# Patient Record
Sex: FEMALE | Race: BLACK OR AFRICAN AMERICAN | URBAN - METROPOLITAN AREA
[De-identification: names, ages, dates, MRNs, and addresses within clinical notes are randomized per-mention and may not be internally consistent; named-entity substitution may affect disease eponyms.]

---
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
## 2023-03-31 NOTE — ED PROVIDER NOTE - CONDITION AT DISCHARGE:
Refill request received for   mometasone (NASONEX) 50 MCG/ACT nasal spray    Last office visit: 2/9/2023  Next office visit: Visit date not found  Last refill: Never filled before  Last labs: 2/9/23     Satisfactory

## 2024-09-18 ENCOUNTER — APPOINTMENT (OUTPATIENT)
Dept: ANTEPARTUM | Facility: CLINIC | Age: 24
End: 2024-09-18

## 2024-09-18 ENCOUNTER — INPATIENT (INPATIENT)
Facility: HOSPITAL | Age: 24
LOS: 4 days | Discharge: HOME | End: 2024-09-23
Attending: SPECIALIST | Admitting: SPECIALIST
Payer: MEDICAID

## 2024-09-18 VITALS
DIASTOLIC BLOOD PRESSURE: 94 MMHG | SYSTOLIC BLOOD PRESSURE: 153 MMHG | HEART RATE: 78 BPM | TEMPERATURE: 99 F | RESPIRATION RATE: 16 BRPM

## 2024-09-18 DIAGNOSIS — O26.899 OTHER SPECIFIED PREGNANCY RELATED CONDITIONS, UNSPECIFIED TRIMESTER: ICD-10-CM

## 2024-09-18 DIAGNOSIS — Z98.890 OTHER SPECIFIED POSTPROCEDURAL STATES: Chronic | ICD-10-CM

## 2024-09-18 LAB
ALBUMIN SERPL ELPH-MCNC: 3.7 G/DL — SIGNIFICANT CHANGE UP (ref 3.3–5)
ALP SERPL-CCNC: 225 U/L — HIGH (ref 40–120)
ALT FLD-CCNC: 14 U/L — SIGNIFICANT CHANGE UP (ref 4–33)
ANION GAP SERPL CALC-SCNC: 13 MMOL/L — SIGNIFICANT CHANGE UP (ref 7–14)
APPEARANCE UR: CLEAR — SIGNIFICANT CHANGE UP
AST SERPL-CCNC: 18 U/L — SIGNIFICANT CHANGE UP (ref 4–32)
BACTERIA # UR AUTO: ABNORMAL /HPF
BASOPHILS # BLD AUTO: 0 K/UL — SIGNIFICANT CHANGE UP (ref 0–0.2)
BASOPHILS NFR BLD AUTO: 0 % — SIGNIFICANT CHANGE UP (ref 0–2)
BILIRUB SERPL-MCNC: 0.2 MG/DL — SIGNIFICANT CHANGE UP (ref 0.2–1.2)
BILIRUB UR-MCNC: NEGATIVE — SIGNIFICANT CHANGE UP
BLD GP AB SCN SERPL QL: NEGATIVE — SIGNIFICANT CHANGE UP
BUN SERPL-MCNC: 4 MG/DL — LOW (ref 7–23)
CALCIUM SERPL-MCNC: 9.6 MG/DL — SIGNIFICANT CHANGE UP (ref 8.4–10.5)
CAST: 4 /LPF — SIGNIFICANT CHANGE UP (ref 0–4)
CHLORIDE SERPL-SCNC: 105 MMOL/L — SIGNIFICANT CHANGE UP (ref 98–107)
CO2 SERPL-SCNC: 19 MMOL/L — LOW (ref 22–31)
COLOR SPEC: YELLOW — SIGNIFICANT CHANGE UP
CREAT ?TM UR-MCNC: 154 MG/DL — SIGNIFICANT CHANGE UP
CREAT SERPL-MCNC: 0.55 MG/DL — SIGNIFICANT CHANGE UP (ref 0.5–1.3)
DIFF PNL FLD: NEGATIVE — SIGNIFICANT CHANGE UP
EGFR: 130 ML/MIN/1.73M2 — SIGNIFICANT CHANGE UP
EOSINOPHIL # BLD AUTO: 0.01 K/UL — SIGNIFICANT CHANGE UP (ref 0–0.5)
EOSINOPHIL NFR BLD AUTO: 0.1 % — SIGNIFICANT CHANGE UP (ref 0–6)
GLUCOSE SERPL-MCNC: 97 MG/DL — SIGNIFICANT CHANGE UP (ref 70–99)
GLUCOSE UR QL: NEGATIVE MG/DL — SIGNIFICANT CHANGE UP
HCT VFR BLD CALC: 35.4 % — SIGNIFICANT CHANGE UP (ref 34.5–45)
HGB BLD-MCNC: 11.6 G/DL — SIGNIFICANT CHANGE UP (ref 11.5–15.5)
IANC: 4.87 K/UL — SIGNIFICANT CHANGE UP (ref 1.8–7.4)
IMM GRANULOCYTES NFR BLD AUTO: 0.6 % — SIGNIFICANT CHANGE UP (ref 0–0.9)
KETONES UR-MCNC: NEGATIVE MG/DL — SIGNIFICANT CHANGE UP
LDH SERPL L TO P-CCNC: 194 U/L — SIGNIFICANT CHANGE UP (ref 135–225)
LEUKOCYTE ESTERASE UR-ACNC: NEGATIVE — SIGNIFICANT CHANGE UP
LYMPHOCYTES # BLD AUTO: 2.45 K/UL — SIGNIFICANT CHANGE UP (ref 1–3.3)
LYMPHOCYTES # BLD AUTO: 30.8 % — SIGNIFICANT CHANGE UP (ref 13–44)
MCHC RBC-ENTMCNC: 27.9 PG — SIGNIFICANT CHANGE UP (ref 27–34)
MCHC RBC-ENTMCNC: 32.8 GM/DL — SIGNIFICANT CHANGE UP (ref 32–36)
MCV RBC AUTO: 85.1 FL — SIGNIFICANT CHANGE UP (ref 80–100)
MONOCYTES # BLD AUTO: 0.57 K/UL — SIGNIFICANT CHANGE UP (ref 0–0.9)
MONOCYTES NFR BLD AUTO: 7.2 % — SIGNIFICANT CHANGE UP (ref 2–14)
NEUTROPHILS # BLD AUTO: 4.87 K/UL — SIGNIFICANT CHANGE UP (ref 1.8–7.4)
NEUTROPHILS NFR BLD AUTO: 61.3 % — SIGNIFICANT CHANGE UP (ref 43–77)
NITRITE UR-MCNC: NEGATIVE — SIGNIFICANT CHANGE UP
NRBC # BLD: 0 /100 WBCS — SIGNIFICANT CHANGE UP (ref 0–0)
NRBC # FLD: 0 K/UL — SIGNIFICANT CHANGE UP (ref 0–0)
PH UR: 6.5 — SIGNIFICANT CHANGE UP (ref 5–8)
PLATELET # BLD AUTO: 213 K/UL — SIGNIFICANT CHANGE UP (ref 150–400)
POTASSIUM SERPL-MCNC: 4 MMOL/L — SIGNIFICANT CHANGE UP (ref 3.5–5.3)
POTASSIUM SERPL-SCNC: 4 MMOL/L — SIGNIFICANT CHANGE UP (ref 3.5–5.3)
PROT ?TM UR-MCNC: 220 MG/DL — SIGNIFICANT CHANGE UP
PROT SERPL-MCNC: 6.6 G/DL — SIGNIFICANT CHANGE UP (ref 6–8.3)
PROT UR-MCNC: 300 MG/DL
PROT/CREAT UR-RTO: 1.4 RATIO — HIGH (ref 0–0.2)
RBC # BLD: 4.16 M/UL — SIGNIFICANT CHANGE UP (ref 3.8–5.2)
RBC # FLD: 14.4 % — SIGNIFICANT CHANGE UP (ref 10.3–14.5)
RBC CASTS # UR COMP ASSIST: 1 /HPF — SIGNIFICANT CHANGE UP (ref 0–4)
REVIEW: SIGNIFICANT CHANGE UP
RH IG SCN BLD-IMP: POSITIVE — SIGNIFICANT CHANGE UP
RH IG SCN BLD-IMP: POSITIVE — SIGNIFICANT CHANGE UP
SODIUM SERPL-SCNC: 137 MMOL/L — SIGNIFICANT CHANGE UP (ref 135–145)
SP GR SPEC: 1.02 — SIGNIFICANT CHANGE UP (ref 1–1.03)
SQUAMOUS # UR AUTO: 4 /HPF — SIGNIFICANT CHANGE UP (ref 0–5)
URATE SERPL-MCNC: 5.4 MG/DL — SIGNIFICANT CHANGE UP (ref 2.5–7)
UROBILINOGEN FLD QL: 0.2 MG/DL — SIGNIFICANT CHANGE UP (ref 0.2–1)
WBC # BLD: 7.95 K/UL — SIGNIFICANT CHANGE UP (ref 3.8–10.5)
WBC # FLD AUTO: 7.95 K/UL — SIGNIFICANT CHANGE UP (ref 3.8–10.5)
WBC UR QL: 3 /HPF — SIGNIFICANT CHANGE UP (ref 0–5)

## 2024-09-18 RX ORDER — OXYTOCIN/RINGER'S LACTATE 20/500ML
167 PLASTIC BAG, INJECTION (ML) INTRAVENOUS
Qty: 30 | Refills: 0 | Status: DISCONTINUED | OUTPATIENT
Start: 2024-09-18 | End: 2024-09-18

## 2024-09-18 RX ORDER — SODIUM CHLORIDE IRRIG SOLUTION 0.9 %
1000 SOLUTION, IRRIGATION IRRIGATION ONCE
Refills: 0 | Status: DISCONTINUED | OUTPATIENT
Start: 2024-09-18 | End: 2024-09-19

## 2024-09-18 RX ORDER — SODIUM CHLORIDE IRRIG SOLUTION 0.9 %
1000 SOLUTION, IRRIGATION IRRIGATION
Refills: 0 | Status: DISCONTINUED | OUTPATIENT
Start: 2024-09-18 | End: 2024-09-23

## 2024-09-18 RX ORDER — SODIUM CHLORIDE IRRIG SOLUTION 0.9 %
1000 SOLUTION, IRRIGATION IRRIGATION ONCE
Refills: 0 | Status: DISCONTINUED | OUTPATIENT
Start: 2024-09-18 | End: 2024-09-18

## 2024-09-18 RX ORDER — CHLORHEXIDINE GLUCONATE ORAL RINSE 1.2 MG/ML
1 SOLUTION DENTAL DAILY
Refills: 0 | Status: DISCONTINUED | OUTPATIENT
Start: 2024-09-18 | End: 2024-09-19

## 2024-09-18 RX ORDER — MAGNESIUM SULFATE 500 MG/ML
2 VIAL (ML) INJECTION
Qty: 40 | Refills: 0 | Status: DISCONTINUED | OUTPATIENT
Start: 2024-09-18 | End: 2024-09-19

## 2024-09-18 RX ORDER — CHLORHEXIDINE GLUCONATE ORAL RINSE 1.2 MG/ML
1 SOLUTION DENTAL DAILY
Refills: 0 | Status: DISCONTINUED | OUTPATIENT
Start: 2024-09-18 | End: 2024-09-18

## 2024-09-18 RX ORDER — SODIUM CHLORIDE IRRIG SOLUTION 0.9 %
1000 SOLUTION, IRRIGATION IRRIGATION
Refills: 0 | Status: DISCONTINUED | OUTPATIENT
Start: 2024-09-18 | End: 2024-09-18

## 2024-09-18 RX ORDER — MORPHINE SULFATE 30 MG/1
4 TABLET, FILM COATED, EXTENDED RELEASE ORAL ONCE
Refills: 0 | Status: DISCONTINUED | OUTPATIENT
Start: 2024-09-18 | End: 2024-09-18

## 2024-09-18 RX ORDER — OXYTOCIN/RINGER'S LACTATE 20/500ML
167 PLASTIC BAG, INJECTION (ML) INTRAVENOUS
Qty: 30 | Refills: 0 | Status: DISCONTINUED | OUTPATIENT
Start: 2024-09-18 | End: 2024-09-20

## 2024-09-18 RX ORDER — SODIUM CHLORIDE IRRIG SOLUTION 0.9 %
1000 SOLUTION, IRRIGATION IRRIGATION
Refills: 0 | Status: DISCONTINUED | OUTPATIENT
Start: 2024-09-18 | End: 2024-09-19

## 2024-09-18 RX ORDER — MAGNESIUM SULFATE 500 MG/ML
4 VIAL (ML) INJECTION ONCE
Refills: 0 | Status: COMPLETED | OUTPATIENT
Start: 2024-09-18 | End: 2024-09-18

## 2024-09-18 RX ADMIN — CHLORHEXIDINE GLUCONATE ORAL RINSE 1 APPLICATION(S): 1.2 SOLUTION DENTAL at 21:50

## 2024-09-18 RX ADMIN — Medication 10 MILLIGRAM(S): at 23:25

## 2024-09-18 RX ADMIN — Medication 300 GRAM(S): at 23:33

## 2024-09-18 RX ADMIN — Medication 50 GM/HR: at 23:56

## 2024-09-18 RX ADMIN — Medication 125 MILLILITER(S): at 21:45

## 2024-09-18 RX ADMIN — MORPHINE SULFATE 4 MILLIGRAM(S): 30 TABLET, FILM COATED, EXTENDED RELEASE ORAL at 23:46

## 2024-09-18 NOTE — OB PROVIDER H&P - NS_OBGYNHISTORY_OBGYN_ALL_OB_FT
OB History: Primigravida    GYN History: Denies history of endometriosis/uterine fibroids/cysts/abnormal pap smears/STIs

## 2024-09-18 NOTE — OB PROVIDER H&P - ATTENDING COMMENTS
Agree with above  Admit for IOL for FGR AC 4%  Pt received prenatal care from [facility]  Elevated BPs noted in triage, continue to monitor, HELLP labs  For BC/CB  Epidural ELEONORA pires MD

## 2024-09-18 NOTE — OB PROVIDER H&P - BLOOD TRANSFUSION, PREVIOUS, PROFILE

## 2024-09-18 NOTE — OB PROVIDER H&P - NSHPPHYSICALEXAM_GEN_ALL_CORE
ICU Vital Signs Last 24 Hrs  T(C): 37.3 (18 Sep 2024 19:29), Max: 37.3 (18 Sep 2024 19:29)  T(F): 99.1 (18 Sep 2024 19:29), Max: 99.1 (18 Sep 2024 19:29)  HR: 81 (18 Sep 2024 20:20) (75 - 85)  BP: 154/82 (18 Sep 2024 20:20) (139/100 - 164/110)  RR: 16 (18 Sep 2024 19:29) (16 - 16)    General: Patient sitting comfortably in bed, A&Ox3  Abd: soft, non-tender, gravid, TOCO in place  Bedside Transabdominal Sonogram: Vertex presentation, posterior placenta, +FH noted, M mode 145 BPM, SUNDAY 10.23cm, BPP 8/8, images saved in ASOB  EFM: 140 BPM baseline, moderate variability, +accels, - decels, category I tracing, reactive  [place]: Irregular contractions  SVE: 0.5/0/-3  Ext:  FROM bilateral no edema  Neuro: grossly intact

## 2024-09-18 NOTE — OB PROVIDER H&P - PROBLEM SELECTOR PLAN 1
P: Admit to L&D for IOL      Induction/Augmentation agent: Vaginal Cytotec & Cook Balloon      HTN labs pending      BP monitoring      Clear liquid diet      EFM/TOCO      IV access      Admit labs & consents      4 epidural PRN

## 2024-09-18 NOTE — OB PROVIDER H&P - HISTORY OF PRESENT ILLNESS
24 y/o  at 39w4d GA, EMANUEL 24, sent from office for IOL for fetal growth restriction.  Patient was dx'd with FGR 3 weeks ago, was previously refusing induction, now agreeable to IOL.  EFW 11% & AC 7% at 37 weeks --> EFW 11% & AC 4% at 39 weeks.  Endorses good fetal movement. Denies: fever, vaginal bleeding, leakage of fluid, abdominal pain, nausea/vomiting.    Primary OB care with Barbara Ortiz CNM @ [facility]  Pregnancy complicated by fetal growth restiction  GBS: Negative   MFM sono from 24: EFW 2949g (11%), AC 4%  EFW **   24 y/o  at 39w4d GA, EMANUEL 24, sent from office for IOL for fetal growth restriction.  Patient was dx'd with FGR 3 weeks ago, was previously refusing induction, now agreeable to IOL.  EFW 11% & AC 7% at 37 weeks --> EFW 11% & AC 4% at 39 weeks.  Endorses good fetal movement. Denies: fever, vaginal bleeding, leakage of fluid, abdominal pain, nausea/vomiting.    Primary OB care with Barbara Ortiz CNM @ [facility]  Pregnancy complicated by fetal growth restriction  GBS: Negative   MFM sono from 24: EFW 2949g (11%), AC 4%

## 2024-09-18 NOTE — OB PROVIDER H&P - NSLOWPPHRISK_OBGYN_A_OB
No previous uterine incision/Friedman Pregnancy/Less than or equal to 4 previous vaginal births/No known bleeding disorder/No history of postpartum hemorrhage/No other PPH risks indicated

## 2024-09-18 NOTE — OB PROVIDER H&P - ASSESSMENT
A: 22 yo  @ 39w4d with fetal growth restriction (EFW 11%, AC 4%), for IOL. GBS negative. Elevated BPs noted in triage, 1 severe range noted.    P: Admit to L&D for IOL      Induction/Augmentation agent: Vaginal Cytotec & Cook Balloon      HTN labs pending      BP monitoring      Clear liquid diet      EFM/TOCO      IV access      Admit labs & consents      4 epidural PRN    Risks, benefits, alternatives, and possible complications have been discussed in detail by Dr Quigley with the patient in her native language, Pre-admission, admission, post admission procedures and expectations were discussed in detail. All questions answered, all appropriate hospital consents were signed. Anticipate normal vaginal delivery.  Informed consent was obtained. The following was discussed:  - Induction/augmentation of labor: use of medication or cook balloon to begin or enhance labor  - Obstetrical management including maternal-fetal contraction monitoring    Case discussed with Dr Dann Teresa PA-C

## 2024-09-19 LAB
AMPHET UR-MCNC: NEGATIVE — SIGNIFICANT CHANGE UP
BARBITURATES UR SCN-MCNC: NEGATIVE — SIGNIFICANT CHANGE UP
BENZODIAZ UR-MCNC: NEGATIVE — SIGNIFICANT CHANGE UP
COCAINE METAB.OTHER UR-MCNC: NEGATIVE — SIGNIFICANT CHANGE UP
CREATININE URINE RESULT, DAU: 60 MG/DL — SIGNIFICANT CHANGE UP
FENTANYL UR QL SCN: NEGATIVE — SIGNIFICANT CHANGE UP
MAGNESIUM SERPL-MCNC: 4.6 MG/DL — HIGH (ref 1.6–2.6)
MAGNESIUM SERPL-MCNC: 5.6 MG/DL — HIGH (ref 1.6–2.6)
MAGNESIUM SERPL-MCNC: 5.7 MG/DL — HIGH (ref 1.6–2.6)
METHADONE UR-MCNC: NEGATIVE — SIGNIFICANT CHANGE UP
OPIATES UR-MCNC: POSITIVE
OXYCODONE UR-MCNC: NEGATIVE — SIGNIFICANT CHANGE UP
PCP SPEC-MCNC: SIGNIFICANT CHANGE UP
PCP UR-MCNC: NEGATIVE — SIGNIFICANT CHANGE UP
T PALLIDUM AB TITR SER: NEGATIVE — SIGNIFICANT CHANGE UP
THC UR QL: NEGATIVE — SIGNIFICANT CHANGE UP

## 2024-09-19 RX ORDER — OXYTOCIN/RINGER'S LACTATE 20/500ML
PLASTIC BAG, INJECTION (ML) INTRAVENOUS
Qty: 30 | Refills: 0 | Status: DISCONTINUED | OUTPATIENT
Start: 2024-09-19 | End: 2024-09-19

## 2024-09-19 RX ORDER — FAMOTIDINE 40 MG
20 TABLET ORAL ONCE
Refills: 0 | Status: COMPLETED | OUTPATIENT
Start: 2024-09-19 | End: 2024-09-19

## 2024-09-19 RX ORDER — ONDANSETRON HCL/PF 4 MG/2 ML
4 VIAL (ML) INJECTION ONCE
Refills: 0 | Status: DISCONTINUED | OUTPATIENT
Start: 2024-09-19 | End: 2024-09-23

## 2024-09-19 RX ORDER — ACETAMINOPHEN 325 MG
1000 TABLET ORAL ONCE
Refills: 0 | Status: COMPLETED | OUTPATIENT
Start: 2024-09-19 | End: 2024-09-19

## 2024-09-19 RX ORDER — CHLORHEXIDINE GLUCONATE ORAL RINSE 1.2 MG/ML
1 SOLUTION DENTAL DAILY
Refills: 0 | Status: DISCONTINUED | OUTPATIENT
Start: 2024-09-19 | End: 2024-09-20

## 2024-09-19 RX ORDER — OXYTOCIN/RINGER'S LACTATE 20/500ML
PLASTIC BAG, INJECTION (ML) INTRAVENOUS
Qty: 30 | Refills: 0 | Status: DISCONTINUED | OUTPATIENT
Start: 2024-09-19 | End: 2024-09-20

## 2024-09-19 RX ADMIN — Medication 20 MILLIGRAM(S): at 12:16

## 2024-09-19 RX ADMIN — Medication 50 GM/HR: at 18:42

## 2024-09-19 RX ADMIN — MORPHINE SULFATE 4 MILLIGRAM(S): 30 TABLET, FILM COATED, EXTENDED RELEASE ORAL at 00:05

## 2024-09-19 RX ADMIN — Medication 400 MILLIGRAM(S): at 12:15

## 2024-09-19 RX ADMIN — MORPHINE SULFATE 4 MILLIGRAM(S): 30 TABLET, FILM COATED, EXTENDED RELEASE ORAL at 00:15

## 2024-09-19 RX ADMIN — Medication 2 MILLIUNIT(S)/MIN: at 15:06

## 2024-09-19 RX ADMIN — Medication 50 GM/HR: at 07:21

## 2024-09-19 RX ADMIN — Medication 30 MILLIGRAM(S): at 02:23

## 2024-09-19 RX ADMIN — Medication 1000 MILLIGRAM(S): at 12:45

## 2024-09-19 NOTE — OB PROVIDER IHI INDUCTION/AUGMENTATION NOTE - NS_OBIHITYPE_OBGYN_ALL_OB
Induction
Nj Abarca  Nephrology  [address], [address]  [place] [address]  Phone: [phone]  Fax: [phone]  Follow Up Time: 1 week    Kevin Zavala  Cardiovascular Disease  [address], [address]  [place] [address]  Phone: [phone]  Fax: [phone]  Follow Up Time: Routine

## 2024-09-20 ENCOUNTER — TRANSCRIPTION ENCOUNTER (OUTPATIENT)
Age: 24
End: 2024-09-20

## 2024-09-20 LAB
MAGNESIUM SERPL-MCNC: 5.2 MG/DL — HIGH (ref 1.6–2.6)
MAGNESIUM SERPL-MCNC: 5.3 MG/DL — HIGH (ref 1.6–2.6)
MAGNESIUM SERPL-MCNC: 5.3 MG/DL — HIGH (ref 1.6–2.6)
MAGNESIUM SERPL-MCNC: 5.8 MG/DL — HIGH (ref 1.6–2.6)
RUBV IGG SER-ACNC: 14.9 INDEX — SIGNIFICANT CHANGE UP
RUBV IGG SER-IMP: POSITIVE — SIGNIFICANT CHANGE UP

## 2024-09-20 PROCEDURE — 59409 OBSTETRICAL CARE: CPT | Mod: U9

## 2024-09-20 RX ORDER — KETOROLAC TROMETHAMINE 10 MG/1
30 TABLET, FILM COATED ORAL ONCE
Refills: 0 | Status: DISCONTINUED | OUTPATIENT
Start: 2024-09-20 | End: 2024-09-20

## 2024-09-20 RX ORDER — MAGNESIUM SULFATE 500 MG/ML
2 VIAL (ML) INJECTION
Qty: 40 | Refills: 0 | Status: DISCONTINUED | OUTPATIENT
Start: 2024-09-20 | End: 2024-09-20

## 2024-09-20 RX ORDER — ANTI-ITCH CREAM 1 G/100G
1 OINTMENT TOPICAL EVERY 6 HOURS
Refills: 0 | Status: DISCONTINUED | OUTPATIENT
Start: 2024-09-20 | End: 2024-09-23

## 2024-09-20 RX ORDER — OXYCODONE HYDROCHLORIDE 30 MG/1
5 TABLET, FILM COATED, EXTENDED RELEASE ORAL
Refills: 0 | Status: DISCONTINUED | OUTPATIENT
Start: 2024-09-20 | End: 2024-09-23

## 2024-09-20 RX ORDER — ACETAMINOPHEN 325 MG
3 TABLET ORAL
Qty: 0 | Refills: 0 | DISCHARGE
Start: 2024-09-20

## 2024-09-20 RX ORDER — PRAMOXINE HYDROCHLORIDE 10 MG/ML
1 LOTION TOPICAL EVERY 4 HOURS
Refills: 0 | Status: DISCONTINUED | OUTPATIENT
Start: 2024-09-20 | End: 2024-09-23

## 2024-09-20 RX ORDER — ACETAMINOPHEN 325 MG
975 TABLET ORAL
Refills: 0 | Status: DISCONTINUED | OUTPATIENT
Start: 2024-09-20 | End: 2024-09-23

## 2024-09-20 RX ORDER — PRENATAL VIT,CAL 76/IRON/FOLIC 29 MG-1 MG
1 TABLET ORAL DAILY
Refills: 0 | Status: DISCONTINUED | OUTPATIENT
Start: 2024-09-20 | End: 2024-09-23

## 2024-09-20 RX ORDER — DIPHENHYDRAMINE HCL 12.5MG/5ML
25 LIQUID (ML) ORAL EVERY 6 HOURS
Refills: 0 | Status: DISCONTINUED | OUTPATIENT
Start: 2024-09-20 | End: 2024-09-23

## 2024-09-20 RX ORDER — ACETAMINOPHEN/DIPHENHYDRAMINE 500MG-25MG
1 TABLET ORAL
Refills: 0 | DISCHARGE

## 2024-09-20 RX ORDER — PYRIDOXINE HCL (VITAMIN B6) 50 MG
0 TABLET ORAL
Refills: 0 | DISCHARGE

## 2024-09-20 RX ORDER — MAGNESIUM SULFATE 500 MG/ML
2 VIAL (ML) INJECTION
Qty: 40 | Refills: 0 | Status: DISCONTINUED | OUTPATIENT
Start: 2024-09-20 | End: 2024-09-21

## 2024-09-20 RX ORDER — OXYCODONE HYDROCHLORIDE 30 MG/1
5 TABLET, FILM COATED, EXTENDED RELEASE ORAL ONCE
Refills: 0 | Status: DISCONTINUED | OUTPATIENT
Start: 2024-09-20 | End: 2024-09-23

## 2024-09-20 RX ORDER — SODIUM CHLORIDE 0.9 % (FLUSH) 0.9 %
3 SYRINGE (ML) INJECTION EVERY 8 HOURS
Refills: 0 | Status: DISCONTINUED | OUTPATIENT
Start: 2024-09-20 | End: 2024-09-23

## 2024-09-20 RX ORDER — SOAP/LANOLIN
1 BAR TOPICAL
Qty: 0 | Refills: 0 | DISCHARGE
Start: 2024-09-20

## 2024-09-20 RX ORDER — DIBUCAINE 1 %
1 OINTMENT (GRAM) TOPICAL EVERY 6 HOURS
Refills: 0 | Status: DISCONTINUED | OUTPATIENT
Start: 2024-09-20 | End: 2024-09-23

## 2024-09-20 RX ORDER — MAGNESIUM HYDROXIDE 400 MG/5ML
30 SUSPENSION, ORAL (FINAL DOSE FORM) ORAL
Refills: 0 | Status: DISCONTINUED | OUTPATIENT
Start: 2024-09-20 | End: 2024-09-23

## 2024-09-20 RX ORDER — PRENATAL VIT,CAL 76/IRON/FOLIC 29 MG-1 MG
1 TABLET ORAL
Refills: 0 | DISCHARGE

## 2024-09-20 RX ORDER — SOAP/LANOLIN
1 BAR TOPICAL EVERY 4 HOURS
Refills: 0 | Status: DISCONTINUED | OUTPATIENT
Start: 2024-09-20 | End: 2024-09-23

## 2024-09-20 RX ORDER — OXYTOCIN/RINGER'S LACTATE 20/500ML
666 PLASTIC BAG, INJECTION (ML) INTRAVENOUS
Qty: 30 | Refills: 0 | Status: DISCONTINUED | OUTPATIENT
Start: 2024-09-20 | End: 2024-09-20

## 2024-09-20 RX ORDER — PANTOPRAZOLE SODIUM 40 MG/1
40 TABLET, DELAYED RELEASE ORAL
Refills: 0 | Status: DISCONTINUED | OUTPATIENT
Start: 2024-09-20 | End: 2024-09-20

## 2024-09-20 RX ORDER — PANTOPRAZOLE SODIUM 40 MG/1
40 TABLET, DELAYED RELEASE ORAL DAILY
Refills: 0 | Status: DISCONTINUED | OUTPATIENT
Start: 2024-09-20 | End: 2024-09-23

## 2024-09-20 RX ORDER — TETANUS TOXOID, REDUCED DIPHTHERIA TOXOID AND ACELLULAR PERTUSSIS VACCINE, ADSORBED 5; 2.5; 8; 8; 2.5 [IU]/.5ML; [IU]/.5ML; UG/.5ML; UG/.5ML; UG/.5ML
0.5 SUSPENSION INTRAMUSCULAR ONCE
Refills: 0 | Status: DISCONTINUED | OUTPATIENT
Start: 2024-09-20 | End: 2024-09-23

## 2024-09-20 RX ADMIN — Medication 50 GM/HR: at 08:01

## 2024-09-20 RX ADMIN — KETOROLAC TROMETHAMINE 30 MILLIGRAM(S): 10 TABLET, FILM COATED ORAL at 11:50

## 2024-09-20 RX ADMIN — Medication 975 MILLIGRAM(S): at 20:45

## 2024-09-20 RX ADMIN — Medication 30 MILLIGRAM(S): at 02:35

## 2024-09-20 RX ADMIN — Medication 50 GM/HR: at 19:06

## 2024-09-20 RX ADMIN — Medication 975 MILLIGRAM(S): at 20:13

## 2024-09-20 RX ADMIN — PANTOPRAZOLE SODIUM 40 MILLIGRAM(S): 40 TABLET, DELAYED RELEASE ORAL at 22:27

## 2024-09-20 RX ADMIN — Medication 50 GM/HR: at 13:27

## 2024-09-20 NOTE — DISCHARGE NOTE OB - CARE PLAN
1 Principal Discharge DX:	 (normal spontaneous vaginal delivery)  Assessment and plan of treatment:	After discharge, please stay on pelvic rest for 6 weeks, meaning no sexual intercourse, no tampons and no douching.  No driving for 2 weeks as women can loose a lot of blood during delivery and there is a possibility of being lightheaded/fainting.  No lifting objects heavier than baby for two weeks.  Expect to have vaginal bleeding/spotting for up to six weeks.  The bleeding should get lighter and more white/light brown with time.  For bleeding soaking more than a pad an hour or passing clots greater than the size of your fist, come in to the emergency department.    Follow up in clinic in 6 weeks.  Secondary Diagnosis:	Severe preeclampsia  Assessment and plan of treatment:	Due to diagnosis of preeclampsia with severe features, please return for OBGYN visit for blood pressure check within 48-72 hours of discharge. At home, please measure blood pressures three times a day. Call OBGYN if pressures are above 140/90 and/or when exhibiting signs or symptoms of pre-eclampsia such as headache, vision changes, difficulty breathing, right quadrant abdominal pain or any concerns.

## 2024-09-20 NOTE — OB PROVIDER DELIVERY SUMMARY - NSSELHIDDEN_OBGYN_ALL_OB_FT
[NS_DeliveryAttending1_OBGYN_ALL_OB_FT:[email or web address]],[NS_DeliveryAssist1_OBGYN_ALL_OB_FT:[id number]],[NS_DeliveryRN_OBGYN_ALL_OB_FT:[email or web address]],[NS_CirculateRN2_OBGYN_ALL_OB_FT:[id number]]

## 2024-09-20 NOTE — DISCHARGE NOTE OB - CARE PROVIDER_API CALL
Elana Ortiz  [address] [place] [address]  Phone: [phone]  Fax: (   )    -  Established Patient  Follow Up Time:    [facility], Oncology Building, Basement  [address], [place] [address]  Phone: [phone]  Fax: (   )    -  Follow Up Time:

## 2024-09-20 NOTE — OB PROVIDER LABOR PROGRESS NOTE - NS_OBIHICONTRACTIONPATTERNDETAILS_OBGYN_ALL_OB_FT
q2 then periods of no contractions.
uterine irritability
irregular
q 2-3 min
IUPC in place inadequate now as pitocin discontinued
q 1-4 min
q2 min
q4m

## 2024-09-20 NOTE — DISCHARGE NOTE OB - PROVIDER TOKENS
FREE:[LAST:[Angel],FIRST:[Elana],PHONE:[phone]],FAX:[(   )    -],ADDRESS:[[address] [place] [address]],ESTABLISHEDPATIENT:[T]] FREE:[LAST:[[facility]],FIRST:[Oncology Building, Basement],PHONE:[phone]],FAX:[(   )    -],ADDRESS:[[address] [place] [address]]]

## 2024-09-20 NOTE — DISCHARGE NOTE OB - PATIENT PORTAL LINK FT
You can access the FollowMyHealth Patient Portal offered by [facility] by registering at the following website: [email or web address]. By joining [facility]’s FollowMyHealth portal, you will also be able to view your health information using other applications (apps) compatible with our system.

## 2024-09-20 NOTE — DISCHARGE NOTE OB - PLAN OF CARE
After discharge, please stay on pelvic rest for 6 weeks, meaning no sexual intercourse, no tampons and no douching.  No driving for 2 weeks as women can loose a lot of blood during delivery and there is a possibility of being lightheaded/fainting.  No lifting objects heavier than baby for two weeks.  Expect to have vaginal bleeding/spotting for up to six weeks.  The bleeding should get lighter and more white/light brown with time.  For bleeding soaking more than a pad an hour or passing clots greater than the size of your fist, come in to the emergency department.    Follow up in clinic in 6 weeks. Due to diagnosis of preeclampsia with severe features, please return for OBGYN visit for blood pressure check within 48-72 hours of discharge. At home, please measure blood pressures three times a day. Call OBGYN if pressures are above 140/90 and/or when exhibiting signs or symptoms of pre-eclampsia such as headache, vision changes, difficulty breathing, right quadrant abdominal pain or any concerns.

## 2024-09-20 NOTE — OB PROVIDER LABOR PROGRESS NOTE - ASSESSMENT
VC placed. CB still in place.     Missy Canals PGY1
IOL for sPEC/Mg  FHT reassuring  pt making cervical change  c/w pitocin     D/w Dr Roshan Das-Pierre PGY4 
-IUPC placed for pitocin titration  -ptiocin currently at 8mu, will uptitrate to adequacy  - FHT Cat 1    D/w Dr. Franklin Das-Pierre PGY4
22 yo  @ 39.6wga IOL FGR preeclampsia with severe features.  GBS neg    1.  FHT category II improved now that pitocin is discontinued and pt repositioned to right lateral position.  Will continue to monitor closely.     2.  Preeclampsia with severe features- BP's within goal range on current regimen of Procarida 30mg XL.  Pt on magnesium sulfate for seizure ppx.  No si/sx of mag toxicity at this time.  U/o adequate.      3.  IOL- Pt now 9.5cm will restart pitocin when FHT allows.  Will start pushing once fully dilated.       Mary Perez MD
3cm around the cervical balloon.   Patient receiving VC, contraction pattern irregular. Concern for hyperstimulation of the uterus with repeat dose.   Will transition to PO until CB is expelled.     DW: Dr. Chris Cloud, PGY4 
A/P: 24y/o P0 @39w5d IOL for FGR with sPEC on Mg  - Labor: spCB/VC/PO, Pit@3p, AROM@430p  - Fetus: cat 1  - GBS: neg  - Pain: sp morphine x1, declining epidural at this time.     Continue to titrate Pitocin as able.     Shanique Mccullough, PGY2  d/w Dr. Cloud and Dr. Perez   
A/P 23y  @39w4d IOL for PEC, now meeting criteria for sPEC  -IOL: CB in place, c/w VC  -Cat 1 tracing  -GBS neg  -EFW 2960  -sPEC - c/w Mg, s/p Pro 10 IR with resolution of SRBP, c/w [id number] qd, HELLP labs wnl, P/C 1.4, continue to monitor  -Analgesia - will order morphine 4x4  -Anticipate     d/w Dr. Nancy Wong, PGY-3
Plan   cont IOL, FHR tracing improving following scalp stim and improvement of blood pressures   will cont pitocin once sustained cat 1 tracing     Ofelia HERCULES 
 @39.5wks FGR, sPEC/Mg   VE unchanged  ISE placed secondary to inability to obtain FHR via external monitoring  Pitocin discontinued  Ephedrine given by CRNA  Patient repositioned   Cont fetal/maternal monitoring  Cont magnesium/BP monitoring  Will restart pitocin when FHR tracing allows    jh Lopez NP
CB and VC placed  Cont EFM and toco    Missy Canals PGY1
Pt requesting epidural for pain.   - for epidural PRN   - Cont EFM and toco  - cont Pit for IOL    D/w Dr. Niraj Angeles PGY1
 @39.6wks FGR  sp cervical balloon, cytotec   sp AROM  ISE remains   Fetal decent of head noted on exam  Cont pitocin  Cont fetal/maternal monitoring    dw MD Niraj Lopez NP

## 2024-09-20 NOTE — OB PROVIDER DELIVERY SUMMARY - NSVAGINALEXAMCERT_OBGYN_ALL_OB
The Delivery OB Provider certifies that vaginal examination and/or abdominal examination after the delivery was done and no foreign body was found.

## 2024-09-20 NOTE — OB PROVIDER DELIVERY SUMMARY - NSPROVIDERDELIVERYNOTE_OBGYN_ALL_OB_FT
Spontaneous vaginal delivery of liveborn infant from OA position. Head, shoulders, and body delivered easily. Infant passed to mother. Cord was clamped and cut. Placenta delivered spontaneously, noted to be intact. Fundal massage was given. Bimanual massage demonstrated a clear uterine sweep. Clot reformed in the DIANA, removed and 1000mcg of cytotec was given. Vaginal exam revealed intact cervix, sulci, vaginal walls. Perineum with second degree laceration, repaired in standard fashion with chromic suture. Excellent hemostasis was noted. Patient stable. Count correct x 2. Magnesium x24 hours post partum

## 2024-09-20 NOTE — OB PROVIDER LABOR PROGRESS NOTE - NSVAGINALEXAM_OBGYN_ALL_OB_DT
18-Sep-2024 22:48
19-Sep-2024 19:11
19-Sep-2024 20:00
20-Sep-2024 05:33
18-Sep-2024 23:46
20-Sep-2024 02:05
19-Sep-2024 16:25
20-Sep-2024 07:38
20-Sep-2024 00:25

## 2024-09-20 NOTE — DISCHARGE NOTE OB - ADDITIONAL INSTRUCTIONS
Follow up with your outpatient provider 1-3 days after discharge for a blood pressure check.  Follow up with your outpatient provider in 4-6 weeks for routine postpartum care. Follow up with your outpatient provider 1-3 days after discharge for a blood pressure check.  Follow up with your outpatient provider in 4-6 weeks for routine postpartum care.    Home with home care.

## 2024-09-20 NOTE — OB PROVIDER LABOR PROGRESS NOTE - NS_SUBJECTIVE/OBJECTIVE_OBGYN_ALL_OB_FT
Attending Labor Note    Patient examined for Cat 2 FHR. s/p Prolonged decel in setting of low BP after epidural. After return to baseline, period of minimal variability. Cervical exam unchanged.
Labor & Delivery Progress Note     Pt seen & examined at bedside for IUPC placement.     T(C): 36.9 (09-20-24 @ 00:26), Max: 37.3 (09-19-24 @ 13:01)  HR: 96 (09-20-24 @ 02:03) (67 - 118)  BP: 128/74 (09-20-24 @ 01:41) (85/49 - 159/97)  RR: 16 (09-19-24 @ 17:30) (15 - 18)  SpO2: 93% (09-20-24 @ 02:03) (90% - 100%)
Patient seen for eval of the CB.
Pt seen and examined at bedside for increased pain. VE unchanged.
patient seen and examined at bedside secondary to FHR decelerations. S/p epidural placement. BPs noted to be 80s/50s. Patient symptomatic. Anesthesia called to bedside for ephedrine.   VS  T(C): 37.1 (09-19-24 @ 17:30)  HR: 108 (09-19-24 @ 20:17)  BP: 131/- (09-19-24 @ 20:18)  RR: 16 (09-19-24 @ 17:30)  SpO2: 98% (09-19-24 @ 20:14)
Patient c/o rectal pressure. Seen and examined at bedside.  VS  T(C): 36.9 (09-20-24 @ 00:26)  HR: 88 (09-20-24 @ 00:40)  BP: 136/85 (09-20-24 @ 00:26)  RR: 16 (09-19-24 @ 17:30)  SpO2: 92% (09-20-24 @ 00:37)
Pt seen and examined at bedside for CB placement.
Pt seen and examined at bedside for VC placement.
Went to examine pt due to prolonged decel.  Pt comfortable sp recent epidural top off.
Labor & Delivery Progress Note     Pt seen & examined at bedside for cervical change.     T(C): 37.0 (09-20-24 @ 05:26), Max: 37.3 (09-19-24 @ 13:01)  HR: 78 (09-20-24 @ 05:30) (67 - 118)  BP: 140/85 (09-20-24 @ 05:27) (85/49 - 159/97)  RR: 16 (09-20-24 @ 05:26) (16 - 18)  SpO2: 94% (09-20-24 @ 05:30) (90% - 100%)
OB Labor Note    S: Patient seen and examined at bedside.     T(C): 37.2 (09-19-24 @ 15:30), Max: 37.3 (09-18-24 @ 19:29)  HR: 97 (09-19-24 @ 16:19) (65 - 118)  BP: 139/90 (09-19-24 @ 16:14) (112/83 - 171/109)  BP(mean): --  ABP: --  ABP(mean): --  RR: 18 (09-19-24 @ 15:30) (15 - 18)  SpO2: 100% (09-19-24 @ 16:19) (92% - 100%)  Wt(kg): --  CVP(mm Hg): --  CI: --  CAPILLARY BLOOD GLUCOSE       N/A      09-18 @ 07:01  -  09-19 @ 07:00  --------------------------------------------------------  IN:    IV PiggyBack: 100 mL    Lactated Ringers: 125 mL    Lactated Ringers: 250 mL    Lactated Ringers: 400 mL    Magnesium Sulfate: 400 mL  Total IN: 1275 mL    OUT:    Voided (mL): 800 mL  Total OUT: 800 mL    Total NET: 475 mL      09-19 @ 07:01  -  09-19 @ 16:26  --------------------------------------------------------  IN:    Lactated Ringers: 350 mL    Magnesium Sulfate: 350 mL  Total IN: 700 mL    OUT:    Voided (mL): 1050 mL  Total OUT: 1050 mL    Total NET: -350 mL
R3 Labor Note    S: Patient evaluated at bedside for cervical change. Pt reports more pain and requesting IV pain medication. CB noted to be firmly in place. Pt with 2 sustained SRBPs, meeting criteria for sPEC. Patient denies headaches, fevers, chills, changes in vision, nausea, vomiting, or RUQ pain. Discussed diagnosis with patient and plan for Pro 10IR and [id number] qd in addition to Magnesium for seizure ppx.    O:  T(C): 36.9 (09-18-24 @ 22:15), Max: 37.3 (09-18-24 @ 19:29)  HR: 83 (09-18-24 @ 23:44) (65 - 85)  BP: 149/92 (09-18-24 @ 23:44) (126/90 - 171/109)  RR: 15 (09-18-24 @ 22:15) (15 - 16)  SpO2: 100% (09-18-24 @ 23:39) (100% - 100%)

## 2024-09-20 NOTE — OB PROVIDER LABOR PROGRESS NOTE - NS_OBIHIFHRDETAILS_OBGYN_ALL_OB_FT
EFM: 125/mod. variability/+accels/+intermittent variable decels
145 minimal variability, improved variability with scalp sti
130 mod deloris/+accels/-decels
130/mod/-accels/-decels
EFM:130 /mod. variability/+accels/-decels  [place]: irregular
130 moderate variability, +15x15 accels, prolonged decel x 6 mins
140/mod/+accels/-decels
125 min-mod deloris/-accels/+late/prolonged decelerations

## 2024-09-20 NOTE — OB RN DELIVERY SUMMARY - NSSELHIDDEN_OBGYN_ALL_OB_FT
[NS_DeliveryAttending1_OBGYN_ALL_OB_FT:[email or web address]],[NS_DeliveryAssist1_OBGYN_ALL_OB_FT:[id number]],[NS_DeliveryRN_OBGYN_ALL_OB_FT:[email or web address]],[NS_CirculateRN2_OBGYN_ALL_OB_FT:[id number]]

## 2024-09-20 NOTE — OB RN DELIVERY SUMMARY - NS_SKININTERRUPTA_OBGYN_ALL_OB
[place]'s clinical indication Maternal request, with counseling and support/[place]'s clinical indication

## 2024-09-20 NOTE — OB RN DELIVERY SUMMARY - NS_LABORCHARACTER_OBGYN_ALL_OB
Induction of labor-AROM/Induction of labor-Medicinal/Augmentation of labor/Internal electronic FM Induction of labor-AROM/Induction of labor-Medicinal/Augmentation of labor/Internal electronic FM/External electronic FM

## 2024-09-20 NOTE — DISCHARGE NOTE OB - NS MD DC FALL RISK RISK
For information on Fall & Injury Prevention, visit: [email or web address] OR  [email or web address] OR  https://www.cdc.gov/steadi/patient.html

## 2024-09-20 NOTE — DISCHARGE NOTE OB - BREAST MILK IS MORE DIGESTIBLE, MAKING VOMITING, DIARRHEA, GAS AND CONSTIPATION LESS COMMON
-- DO NOT REPLY / DO NOT REPLY ALL --  -- Message is from the [facility]--    COVID-19 Universal Screening: Negative    General Patient Message      Reason for Call: PATIENT CALLED IN AND STATED THAT HE NEVER RECEIVED HIS 2ND FOLLOW UP CALL, HE IS UPSET HE NEVER GOT A CALL BACK IN REGARDS TO HIS FINGER PAIN.     Caller Information       Type Contact Phone    06/26/2020 04:09 PM Phone (Incoming) Isidoro Francis (Self) [phone] (M)    07/06/2020 09:01 AM Phone (Incoming) Isidoro Francis (Self) [phone] (M)          Alternative phone number: NONE     Turnaround time given to caller:   \"This message will be sent to [state Provider's name]. The clinical team will fulfill your request as soon as they review your message.\"     Statement Selected

## 2024-09-20 NOTE — OB RN DELIVERY SUMMARY - NS_SEPSISRSKCALC_OBGYN_ALL_OB_FT
EOS calculated successfully. EOS Risk Factor: 0.5/1000 live births ([facility] national incidence); GA=39w6d; Temp=99.14; ROM=16.633; GBS='Negative'; Antibiotics='No antibiotics or any antibiotics < 2 hrs prior to birth'

## 2024-09-20 NOTE — DISCHARGE NOTE OB - HOSPITAL COURSE
Patient had an uncomplicated  followed by a postpartum course complicated by preeclampsia with severe features. Patient required a push of Procardia 10mg IR on  and was started on Procardia 30XL qd on . Patient also completed 24 hr of Magnesium sulfate for seizure prophylaxis on -. , Hct 35.4->***. On postpartum day ***, patient was discharged home in stable condition, voiding spontaneously and with normal vital signs.   Patient had an uncomplicated  followed by a postpartum course complicated by preeclampsia with severe features. Patient required a push of Procardia 10mg IR on  and was started on Procardia 30XL qd on . Patient also completed 24 hr of Magnesium sulfate for seizure prophylaxis on -. , Hct 35.4->22.6->28.9. On postpartum day 3, patient was discharged home in stable condition, voiding spontaneously and with normal vital signs.   Patient had an uncomplicated  followed by a postpartum course complicated by preeclampsia with severe features. Patient required an oral dose of Procardia 10mg IR on  and was started on Procardia 30XL qd on . Patient also completed 24 hr of Magnesium sulfate for seizure prophylaxis on -. , Hct 35.4->22.6->28.9. On postpartum day 3, patient was discharged home in stable condition, voiding spontaneously and with normal vital signs.

## 2024-09-20 NOTE — OB NEONATOLOGY/PEDIATRICIAN DELIVERY SUMMARY - NSPEDSNEONOTESA_OBGYN_ALL_OB_FT
Baby is a 39.6 wk SGA female born to a 24 y/o  mother via . Peds called to delivery for Cat II and Mg. Maternal history uncomplicated. Prenatal history complicated by IUGR and sPEC. Maternal blood type O+. PNL: HIV neg/RPR NR/HBsAg neg,, rubella pending. GBS negative on . AROM at 1621 on , clear fluids. ROM duration 16h38m. Highest maternal temp 37.2. EOS: 0.23. Baby born vigorous and crying spontaneously. Warmed, dried, suctioned stimulated. Baby given CPAP5/21% from 3MOL intul 8MOL for grunting. Apgars 8/9. Mom plans to breastfeed/bottlefeed and consents hepB.   BW: 2370g  :   TOB: 0859    Physical Exam:  Gen: NAD, +grimace  HEENT: anterior fontanel open soft and flat, no cleft lip/palate, ears normal set, no ear pits or tags. no lesions in mouth/throat, nares clinically patent  Resp: no increased work of breathing, good air entry b/l, clear to auscultation bilaterally  Cardio: Normal S1/S2, regular rate and rhythm, no murmurs, rubs or gallops  Abd: soft, non tender, non distended, + bowel sounds, umbilical cord with 3 vessels  Neuro: +grasp/suck/juan, normal tone  Extremities: negative sidhu and ortolani, moving all extremities, full range of motion x 4, no crepitus  Skin: pink, warm  Genitals: normal female anatomy, Crow 1, anus patent

## 2024-09-21 LAB
ALBUMIN SERPL ELPH-MCNC: 3 G/DL — LOW (ref 3.3–5)
ALP SERPL-CCNC: 162 U/L — HIGH (ref 40–120)
ALT FLD-CCNC: 12 U/L — SIGNIFICANT CHANGE UP (ref 4–33)
ANION GAP SERPL CALC-SCNC: 13 MMOL/L — SIGNIFICANT CHANGE UP (ref 7–14)
AST SERPL-CCNC: 25 U/L — SIGNIFICANT CHANGE UP (ref 4–32)
BASOPHILS # BLD AUTO: 0.01 K/UL — SIGNIFICANT CHANGE UP (ref 0–0.2)
BASOPHILS NFR BLD AUTO: 0.1 % — SIGNIFICANT CHANGE UP (ref 0–2)
BILIRUB SERPL-MCNC: <0.2 MG/DL — SIGNIFICANT CHANGE UP (ref 0.2–1.2)
BUN SERPL-MCNC: 6 MG/DL — LOW (ref 7–23)
CALCIUM SERPL-MCNC: 7.5 MG/DL — LOW (ref 8.4–10.5)
CHLORIDE SERPL-SCNC: 104 MMOL/L — SIGNIFICANT CHANGE UP (ref 98–107)
CO2 SERPL-SCNC: 21 MMOL/L — LOW (ref 22–31)
CREAT SERPL-MCNC: 0.6 MG/DL — SIGNIFICANT CHANGE UP (ref 0.5–1.3)
EGFR: 128 ML/MIN/1.73M2 — SIGNIFICANT CHANGE UP
EOSINOPHIL # BLD AUTO: 0.01 K/UL — SIGNIFICANT CHANGE UP (ref 0–0.5)
EOSINOPHIL NFR BLD AUTO: 0.1 % — SIGNIFICANT CHANGE UP (ref 0–6)
GLUCOSE SERPL-MCNC: 89 MG/DL — SIGNIFICANT CHANGE UP (ref 70–99)
HCT VFR BLD CALC: 22.6 % — LOW (ref 34.5–45)
HCT VFR BLD CALC: 28.9 % — LOW (ref 34.5–45)
HGB BLD-MCNC: 7.7 G/DL — LOW (ref 11.5–15.5)
HGB BLD-MCNC: 9.6 G/DL — LOW (ref 11.5–15.5)
IANC: 7.78 K/UL — HIGH (ref 1.8–7.4)
IMM GRANULOCYTES NFR BLD AUTO: 0.4 % — SIGNIFICANT CHANGE UP (ref 0–0.9)
LDH SERPL L TO P-CCNC: 240 U/L — HIGH (ref 135–225)
LYMPHOCYTES # BLD AUTO: 26.9 % — SIGNIFICANT CHANGE UP (ref 13–44)
LYMPHOCYTES # BLD AUTO: 3.24 K/UL — SIGNIFICANT CHANGE UP (ref 1–3.3)
MAGNESIUM SERPL-MCNC: 5.4 MG/DL — HIGH (ref 1.6–2.6)
MAGNESIUM SERPL-MCNC: 5.5 MG/DL — HIGH (ref 1.6–2.6)
MCHC RBC-ENTMCNC: 28.4 PG — SIGNIFICANT CHANGE UP (ref 27–34)
MCHC RBC-ENTMCNC: 28.6 PG — SIGNIFICANT CHANGE UP (ref 27–34)
MCHC RBC-ENTMCNC: 33.2 GM/DL — SIGNIFICANT CHANGE UP (ref 32–36)
MCHC RBC-ENTMCNC: 34.1 GM/DL — SIGNIFICANT CHANGE UP (ref 32–36)
MCV RBC AUTO: 84 FL — SIGNIFICANT CHANGE UP (ref 80–100)
MCV RBC AUTO: 85.5 FL — SIGNIFICANT CHANGE UP (ref 80–100)
MONOCYTES # BLD AUTO: 0.94 K/UL — HIGH (ref 0–0.9)
MONOCYTES NFR BLD AUTO: 7.8 % — SIGNIFICANT CHANGE UP (ref 2–14)
NEUTROPHILS # BLD AUTO: 7.78 K/UL — HIGH (ref 1.8–7.4)
NEUTROPHILS NFR BLD AUTO: 64.7 % — SIGNIFICANT CHANGE UP (ref 43–77)
NRBC # BLD: 0 /100 WBCS — SIGNIFICANT CHANGE UP (ref 0–0)
NRBC # BLD: 0 /100 WBCS — SIGNIFICANT CHANGE UP (ref 0–0)
NRBC # FLD: 0 K/UL — SIGNIFICANT CHANGE UP (ref 0–0)
NRBC # FLD: 0 K/UL — SIGNIFICANT CHANGE UP (ref 0–0)
PLATELET # BLD AUTO: 185 K/UL — SIGNIFICANT CHANGE UP (ref 150–400)
PLATELET # BLD AUTO: 207 K/UL — SIGNIFICANT CHANGE UP (ref 150–400)
POTASSIUM SERPL-MCNC: 3.6 MMOL/L — SIGNIFICANT CHANGE UP (ref 3.5–5.3)
POTASSIUM SERPL-SCNC: 3.6 MMOL/L — SIGNIFICANT CHANGE UP (ref 3.5–5.3)
PROT SERPL-MCNC: 5.3 G/DL — LOW (ref 6–8.3)
RBC # BLD: 2.69 M/UL — LOW (ref 3.8–5.2)
RBC # BLD: 3.38 M/UL — LOW (ref 3.8–5.2)
RBC # FLD: 14.7 % — HIGH (ref 10.3–14.5)
RBC # FLD: 14.9 % — HIGH (ref 10.3–14.5)
SODIUM SERPL-SCNC: 138 MMOL/L — SIGNIFICANT CHANGE UP (ref 135–145)
URATE SERPL-MCNC: 6.1 MG/DL — SIGNIFICANT CHANGE UP (ref 2.5–7)
WBC # BLD: 12.03 K/UL — HIGH (ref 3.8–10.5)
WBC # BLD: 13.16 K/UL — HIGH (ref 3.8–10.5)
WBC # FLD AUTO: 12.03 K/UL — HIGH (ref 3.8–10.5)
WBC # FLD AUTO: 13.16 K/UL — HIGH (ref 3.8–10.5)

## 2024-09-21 RX ORDER — FERROUS SULFATE 325(65) MG
325 TABLET ORAL DAILY
Refills: 0 | Status: DISCONTINUED | OUTPATIENT
Start: 2024-09-21 | End: 2024-09-23

## 2024-09-21 RX ADMIN — Medication 975 MILLIGRAM(S): at 22:30

## 2024-09-21 RX ADMIN — Medication 600 MILLIGRAM(S): at 11:54

## 2024-09-21 RX ADMIN — Medication 600 MILLIGRAM(S): at 18:16

## 2024-09-21 RX ADMIN — Medication 50 GM/HR: at 07:21

## 2024-09-21 RX ADMIN — Medication 975 MILLIGRAM(S): at 02:06

## 2024-09-21 RX ADMIN — Medication 30 MILLIGRAM(S): at 02:06

## 2024-09-21 RX ADMIN — Medication 975 MILLIGRAM(S): at 15:49

## 2024-09-21 RX ADMIN — Medication 1 TABLET(S): at 11:13

## 2024-09-21 RX ADMIN — Medication 3 MILLILITER(S): at 22:00

## 2024-09-21 RX ADMIN — Medication 600 MILLIGRAM(S): at 11:12

## 2024-09-21 RX ADMIN — Medication 600 MILLIGRAM(S): at 01:40

## 2024-09-21 RX ADMIN — Medication 50 MILLILITER(S): at 07:21

## 2024-09-21 RX ADMIN — Medication 600 MILLIGRAM(S): at 18:46

## 2024-09-21 RX ADMIN — Medication 500 MILLIGRAM(S): at 11:13

## 2024-09-21 RX ADMIN — Medication 325 MILLIGRAM(S): at 11:13

## 2024-09-21 RX ADMIN — Medication 3 MILLILITER(S): at 14:40

## 2024-09-21 RX ADMIN — Medication 975 MILLIGRAM(S): at 21:51

## 2024-09-21 RX ADMIN — Medication 975 MILLIGRAM(S): at 16:27

## 2024-09-21 RX ADMIN — Medication 975 MILLIGRAM(S): at 02:40

## 2024-09-21 RX ADMIN — Medication 600 MILLIGRAM(S): at 00:10

## 2024-09-21 NOTE — PROGRESS NOTE ADULT - ASSESSMENT
Assessment:   22yo now postpartum day 1 from a  c/b sPEC on Magnesium, recovering well.     Plan:   #sPEC  - Continue Procardia 30 mg QD  - Continue magnesium, plan to d/c today at 9 AM   - BPs mostly within range, max 141/84 @18:15 on   - Continue to monitor     #Postpartum  - Continue scheduled Ibuprofen and Acetaminophen for pain, Oxycodone available PRN for breakthrough pain.  - Increase ambulation, SCDs when not ambulating  - Continue regular diet    Patrizia Springer, PGY-1

## 2024-09-21 NOTE — PROVIDER CONTACT NOTE (OTHER) - BACKGROUND
NSD from 9/20 @ 0859. PEC w/ severe features PCR 1.4. Patient on magnesium sulfate 2g/hr. Last mag level at 0006 was 5.40.

## 2024-09-21 NOTE — LACTATION INITIAL EVALUATION - INTERVENTION OUTCOME
Primary nurse made aware of current Status. Mother and Infant roomed-in.   Mother educated on safe skin to skin care, safe sleep practices and environment. Call bell within reach.

## 2024-09-21 NOTE — PROVIDER CONTACT NOTE (OTHER) - ASSESSMENT
Patient asymptomatic, denies SOB, chest pain, dizziness, and headache. Procardia 30mg XL was given at 0206. Mag level and PIH labs sent at 0600.

## 2024-09-21 NOTE — LACTATION INITIAL EVALUATION - LACTATION INTERVENTIONS
Mom educated about babies less than 24 hours of age will be sleepy. Made aware of cluster feeding that occurs after 24 hours of life and to be cautious of sleep deprivation in order to maintain infant and mother safety. Instructed to place infant in bassinet or call for assistance if feeling sleepy or tired.Recognition of feeding cues and to feed the baby on demand based on cues at least 8-12 times in a day. Instructed pt. to wake the baby to feed if no feeding cues are seen within 3h since prior feed. Pt. educated on the nutritional needs of the baby, how many wet and dirty diapers to expect, along with the amount of times the baby needs to be placed on the breast at this time.  use  feeding log to record feedings along with wet and dirty diapers. instructed in hand expression with good return demonstration.  Reviewed safe skin to skin. Verbalized understanding of education. pt is sleepy on mag, Safe Breastfeeding Explained Assisted mom with latch and positioning. Encouraged deeper Latch. Discussed  prevention  and  treatment of  sore nipples using colostrum care and/or lanolin. Reviewed proper positioning no matter what position she chooses to use: belly to belly, alignment, and supporting the head and shoulders. Mother and Infant roomed-in.   Mother educated on safe skin to skin care, safe sleep practices and environment. Call bell within reach. ,/initiate/review safe skin-to-skin/initiate/review hand expression/initiate/review techniques for position and latch/initiate/review supplementation plan due to medical indications/initiate/review nipple shield use/review techniques to increase milk supply/review techniques to manage sore nipples/engorgement/initiate/review finger suck/initiate/review breast massage/compression/initiate/review alternate feeding method/reviewed components of an effective feeding and at least 8 effective feedings per day required/reviewed importance of monitoring infant diapers, the breastfeeding log, and minimum output each day/reviewed risks of unnecessary formula supplementation/reviewed risks of artificial nipples/reviewed strategies to transition to breastfeeding only/reviewed benefits and recommendations for rooming in/reviewed feeding on demand/by cue at least 8 times a day/reviewed indications of inadequate milk transfer that would require supplementation

## 2024-09-21 NOTE — PROGRESS NOTE ADULT - SUBJECTIVE AND OBJECTIVE BOX
OB Postpartum Progress Note: PPD #1     22yo now PPD #1 after  seen and examined at bedside, no acute overnight events. Patient denies current complaints, her pain is well controlled. States she is ambulating, voiding spontaneously, passing gas, and tolerating regular diet. Denies CP, SOB, N/V, HA, blurred vision, epigastric pain.    Vital Signs Last 24 Hours  T(C): 36.5 (24 @ 05:55), Max: 36.8 (24 @ 08:01)  HR: 81 (24 @ 05:55) (75 - 126)  BP: 132/99 (24 @ 05:55) (117/77 - 187/90)  RR: 18 (24 @ 05:55) (17 - 18)  SpO2: 100% (24 @ 05:55) (90% - 100%)    Physical Exam:  General: NAD, sleeping comfortably in bed, arousable   Abdomen: Soft, appropriately-tender, non-distended, fundus firm  Extremities: Full ROM, moving all extremities spontaneously, no edema  Pelvic: Lochia wnl    Labs:    Blood Type: O Positive  Antibody Screen: --  RPR: Negative               7.7    12.03 )-----------( 185      (  @ 05:45 )             22.6                11.6   7.95  )-----------( 213      (  @ 19:45 )             35.4         MEDICATIONS  (STANDING):  acetaminophen     Tablet .. 975 milliGRAM(s) Oral <User Schedule>  diphtheria/tetanus/pertussis (acellular) Vaccine (Adacel) 0.5 milliLiter(s) IntraMuscular once  ibuprofen  Tablet. 600 milliGRAM(s) Oral every 6 hours  lactated ringers. 1000 milliLiter(s) (50 mL/Hr) IV Continuous <Continuous>  magnesium sulfate Infusion 2 Gm/Hr (50 mL/Hr) IV Continuous <Continuous>  NIFEdipine XL 30 milliGRAM(s) Oral daily  ondansetron Injectable 4 milliGRAM(s) IV Push once  pantoprazole    Tablet 40 milliGRAM(s) Oral daily  prenatal multivitamin 1 Tablet(s) Oral daily  sodium chloride 0.9% lock flush 3 milliLiter(s) IV Push every 8 hours    MEDICATIONS  (PRN):  benzocaine 20%/menthol 0.5% Spray 1 Spray(s) Topical every 6 hours PRN for Perineal discomfort  dibucaine 1% Ointment 1 Application(s) Topical every 6 hours PRN Perineal discomfort  diphenhydrAMINE 25 milliGRAM(s) Oral every 6 hours PRN Pruritus  hydrocortisone 1% Cream 1 Application(s) Topical every 6 hours PRN Moderate Pain (4-6)  lanolin Ointment 1 Application(s) Topical every 6 hours PRN nipple soreness  magnesium hydroxide Suspension 30 milliLiter(s) Oral two times a day PRN Constipation  oxyCODONE    IR 5 milliGRAM(s) Oral once PRN Moderate to Severe Pain (4-10)  oxyCODONE    IR 5 milliGRAM(s) Oral every 3 hours PRN Moderate to Severe Pain (4-10)  pramoxine 1%/zinc 5% Cream 1 Application(s) Topical every 4 hours PRN Moderate Pain (4-6)  simethicone 80 milliGRAM(s) Chew every 4 hours PRN Gas  witch hazel Pads 1 Application(s) Topical every 4 hours PRN Perineal discomfort

## 2024-09-21 NOTE — PROGRESS NOTE ADULT - ASSESSMENT
INTERVAL HPI/OVERNIGHT EVENTS:  23y Female s/p labor epidural on     Vital Signs Last 24 Hrs  T(C): 36.6 (21 Sep 2024 14:00), Max: 37.1 (21 Sep 2024 10:11)  T(F): 97.9 (21 Sep 2024 14:00), Max: 98.7 (21 Sep 2024 10:11)  HR: 100 (21 Sep 2024 14:00) (81 - 101)  BP: 125/70 (21 Sep 2024 14:00) (117/77 - 141/84)  BP(mean): --  RR: 18 (21 Sep 2024 14:00) (17 - 18)  SpO2: 100% (21 Sep 2024 14:00) (96% - 100%)    Parameters below as of 21 Sep 2024 14:00  Patient On (Oxygen Delivery Method): room air    Patient seen, doing well, no anesthetic complications or complaints noted or reported.

## 2024-09-22 RX ADMIN — Medication 30 MILLIGRAM(S): at 05:47

## 2024-09-22 RX ADMIN — Medication 600 MILLIGRAM(S): at 05:47

## 2024-09-22 RX ADMIN — Medication 325 MILLIGRAM(S): at 15:17

## 2024-09-22 RX ADMIN — Medication 3 MILLILITER(S): at 14:51

## 2024-09-22 RX ADMIN — Medication 600 MILLIGRAM(S): at 06:41

## 2024-09-22 RX ADMIN — Medication 600 MILLIGRAM(S): at 15:18

## 2024-09-22 RX ADMIN — Medication 975 MILLIGRAM(S): at 03:18

## 2024-09-22 RX ADMIN — Medication 600 MILLIGRAM(S): at 16:18

## 2024-09-22 RX ADMIN — Medication 3 MILLILITER(S): at 22:21

## 2024-09-22 RX ADMIN — Medication 3 MILLILITER(S): at 05:49

## 2024-09-22 RX ADMIN — Medication 600 MILLIGRAM(S): at 23:37

## 2024-09-22 RX ADMIN — Medication 975 MILLIGRAM(S): at 04:00

## 2024-09-22 RX ADMIN — Medication 600 MILLIGRAM(S): at 00:16

## 2024-09-22 RX ADMIN — Medication 600 MILLIGRAM(S): at 01:00

## 2024-09-22 RX ADMIN — Medication 500 MILLIGRAM(S): at 15:17

## 2024-09-22 NOTE — PROGRESS NOTE ADULT - SUBJECTIVE AND OBJECTIVE BOX
OB Progress Note:  PPD #2    S: 24yo now PPD#2 after  seen and examined at bedside, no acute overnight events. Patient denies current complaints, her pain is well controlled. States she is ambulating, voiding spontaneously, passing gas, and tolerating regular diet. Denies CP, SOB, N/V, HA, blurred vision, epigastric pain.    O:  Vitals:  Vital Signs Last 24 Hrs  T(C): 36.8 (22 Sep 2024 05:40), Max: 37.2 (22 Sep 2024 01:57)  T(F): 98.3 (22 Sep 2024 05:40), Max: 98.9 (22 Sep 2024 01:57)  HR: 99 (22 Sep 2024 05:40) (82 - 100)  BP: 122/85 (22 Sep 2024 05:40) (110/58 - 131/85)  BP(mean): 71 (22 Sep 2024 01:57) (71 - 71)  RR: 19 (22 Sep 2024 05:40) (17 - 19)  SpO2: 100% (22 Sep 2024 05:40) (96% - 100%)    Parameters below as of 22 Sep 2024 05:40  Patient On (Oxygen Delivery Method): room air        MEDICATIONS  (STANDING):  acetaminophen     Tablet .. 975 milliGRAM(s) Oral <User Schedule>  ascorbic acid 500 milliGRAM(s) Oral daily  diphtheria/tetanus/pertussis (acellular) Vaccine (Adacel) 0.5 milliLiter(s) IntraMuscular once  ferrous    sulfate 325 milliGRAM(s) Oral daily  ibuprofen  Tablet. 600 milliGRAM(s) Oral every 6 hours  lactated ringers. 1000 milliLiter(s) (50 mL/Hr) IV Continuous <Continuous>  NIFEdipine XL 30 milliGRAM(s) Oral daily  ondansetron Injectable 4 milliGRAM(s) IV Push once  pantoprazole    Tablet 40 milliGRAM(s) Oral daily  prenatal multivitamin 1 Tablet(s) Oral daily  sodium chloride 0.9% lock flush 3 milliLiter(s) IV Push every 8 hours      Labs:  Blood type: O Positive  Rubella IgG: RPR: Negative                          9.6[L]   13.16[H] >-----------< 207    (  @ 11:55 )             28.9[L]                        7.7[L]   12.03[H] >-----------< 185    (  @ 05:45 )             22.6[L]    24 @ 05:45      138  |  104  |  6[L]  ----------------------------<  89  3.6   |  21[L]  |  0.60        Ca    7.5[L]      21 Sep 2024 05:45  Mg     5.50[H]     09-21  Mg     5.40[H]     09-21  Mg     5.30[H]     09-20  Mg     5.20[H]     09-20  Mg     5.30[H]     09-20  Mg     5.80[H]     09-19  Mg     5.70[H]     09-19  Mg     5.60[H]         TPro  5.3[L]  /  Alb  3.0[L]  /  TBili  <0.2  /  DBili  x   /  AST  25  /  ALT  12  /  AlkPhos  162[H]  24 @ 05:45      Physical Exam:  General: NAD, sleeping comfortably in bed, arousable   Abdomen: Soft, appropriately-tender, non-distended, fundus firm  Extremities: Full ROM, moving all extremities spontaneously, no edema  Pelvic: Lochia wnl

## 2024-09-22 NOTE — CHART NOTE - NSCHARTNOTEFT_GEN_A_CORE
Patient seen to discuss discharge planning. The patient recently got a new apartment through a [employer] system. However, she is unable to move furniture into the apartment during the weekend, so there is no furniture or bed in the unit.     Patient will stay overnight and be discharged tomorrow (9/23) due to lack of safe discharge today.     Patient seen with Dr. Perez.  Patrizia Springer, PGY-1
Pt Utox positive for opiates this AM 9/19. Pt received dose of morphine 9/18 @11:45p. Pt denies hx of opiate or substance use.       D/w Dr. Niraj Angeles PGY1
Vaginal cytotec #3 placed  CB still in place  tracing Cat 1    S. Das-Pierre PGY4

## 2024-09-22 NOTE — PROGRESS NOTE ADULT - ASSESSMENT
[id number] now postpartum day 2 from a  c/b sPEC s/p Magnesium, recovering well.     Plan:   #sPEC  - Continue Procardia 30 mg QD  - s/p Mg (-)  - BPs mostly within range  - Continue to monitor     #Postpartum  - Continue scheduled Ibuprofen and Acetaminophen for pain, Oxycodone available PRN for breakthrough pain.  - Increase ambulation, SCDs when not ambulating  - Continue regular diet    Patrizia Springer, PGY-1

## 2024-09-23 VITALS
RESPIRATION RATE: 18 BRPM | OXYGEN SATURATION: 100 % | SYSTOLIC BLOOD PRESSURE: 126 MMHG | HEART RATE: 104 BPM | DIASTOLIC BLOOD PRESSURE: 77 MMHG | TEMPERATURE: 99 F

## 2024-09-23 RX ORDER — INFLUENZA VIRUS VACCINE 15; 15; 15; 15 UG/.5ML; UG/.5ML; UG/.5ML; UG/.5ML
0.5 SUSPENSION INTRAMUSCULAR ONCE
Refills: 0 | Status: DISCONTINUED | OUTPATIENT
Start: 2024-09-23 | End: 2024-09-23

## 2024-09-23 RX ADMIN — Medication 600 MILLIGRAM(S): at 00:10

## 2024-09-23 RX ADMIN — Medication 600 MILLIGRAM(S): at 06:40

## 2024-09-23 RX ADMIN — Medication 600 MILLIGRAM(S): at 06:09

## 2024-09-23 RX ADMIN — Medication 3 MILLILITER(S): at 14:22

## 2024-09-23 RX ADMIN — Medication 30 MILLIGRAM(S): at 06:09

## 2024-09-23 RX ADMIN — Medication 3 MILLILITER(S): at 06:07

## 2024-09-23 RX ADMIN — PANTOPRAZOLE SODIUM 40 MILLIGRAM(S): 40 TABLET, DELAYED RELEASE ORAL at 14:25

## 2024-09-23 NOTE — PROGRESS NOTE ADULT - SUBJECTIVE AND OBJECTIVE BOX
OB Progress Note:  PPD#3    S: 22yo PPD#3 s/p  c/b sPEC s/p Magnesium treatment. Patient feels well. Pain is well controlled. She is tolerating a regular diet and passing flatus. She is voiding spontaneously, and ambulating without difficulty. Denies CP/SOB. Denies lightheadedness/dizziness. Denies N/V. Denies fever/chills.    O:  Vitals:   Vital Signs Last 24 Hrs  T(C): 36.7 (23 Sep 2024 05:30), Max: 37.9 (22 Sep 2024 15:36)  T(F): 98.1 (23 Sep 2024 05:30), Max: 100.2 (22 Sep 2024 15:36)  HR: 95 (23 Sep 2024 05:30) (89 - 112)  BP: 129/78 (23 Sep 2024 05:30) (126/76 - 137/83)  BP(mean): --  RR: 16 (23 Sep 2024 05:30) (16 - 18)  SpO2: 100% (23 Sep 2024 05:30) (100% - 100%)    Parameters below as of 23 Sep 2024 05:30  Patient On (Oxygen Delivery Method): room air        MEDICATIONS  (STANDING):  acetaminophen     Tablet .. 975 milliGRAM(s) Oral <User Schedule>  ascorbic acid 500 milliGRAM(s) Oral daily  diphtheria/tetanus/pertussis (acellular) Vaccine (Adacel) 0.5 milliLiter(s) IntraMuscular once  ferrous    sulfate 325 milliGRAM(s) Oral daily  ibuprofen  Tablet. 600 milliGRAM(s) Oral every 6 hours  lactated ringers. 1000 milliLiter(s) (50 mL/Hr) IV Continuous <Continuous>  NIFEdipine XL 30 milliGRAM(s) Oral daily  ondansetron Injectable 4 milliGRAM(s) IV Push once  pantoprazole    Tablet 40 milliGRAM(s) Oral daily  prenatal multivitamin 1 Tablet(s) Oral daily  sodium chloride 0.9% lock flush 3 milliLiter(s) IV Push every 8 hours    MEDICATIONS  (PRN):  benzocaine 20%/menthol 0.5% Spray 1 Spray(s) Topical every 6 hours PRN for Perineal discomfort  dibucaine 1% Ointment 1 Application(s) Topical every 6 hours PRN Perineal discomfort  diphenhydrAMINE 25 milliGRAM(s) Oral every 6 hours PRN Pruritus  hydrocortisone 1% Cream 1 Application(s) Topical every 6 hours PRN Moderate Pain (4-6)  lanolin Ointment 1 Application(s) Topical every 6 hours PRN nipple soreness  magnesium hydroxide Suspension 30 milliLiter(s) Oral two times a day PRN Constipation  oxyCODONE    IR 5 milliGRAM(s) Oral once PRN Moderate to Severe Pain (4-10)  oxyCODONE    IR 5 milliGRAM(s) Oral every 3 hours PRN Moderate to Severe Pain (4-10)  pramoxine 1%/zinc 5% Cream 1 Application(s) Topical every 4 hours PRN Moderate Pain (4-6)  simethicone 80 milliGRAM(s) Chew every 4 hours PRN Gas  witch hazel Pads 1 Application(s) Topical every 4 hours PRN Perineal discomfort      Labs:  Blood type: O Positive  Rubella IgG: RPR: Negative                          9.6[L]   13.16[H] >-----------< 207    (  @ 11:55 )             28.9[L]                        7.7[L]   12.03[H] >-----------< 185    (  @ 05:45 )             22.6[L]    24 @ 05:45      138  |  104  |  6[L]  ----------------------------<  89  3.6   |  21[L]  |  0.60        Ca    7.5[L]      21 Sep 2024 05:45  Mg     5.50[H]     -  Mg     5.40[H]       Mg     5.30[H]     -  Mg     5.20[H]     -    TPro  5.3[L]  /  Alb  3.0[L]  /  TBili  <0.2  /  DBili  x   /  AST  25  /  ALT  12  /  AlkPhos  162[H]  24 @ 05:45          Physical Exam:  General: NAD  Abdomen: soft, non-tender, non-distended, fundus firm  Vaginal: Lochia wnl  Extremities: No erythema/edema     OB Progress Note:  PPD#3    S: 24yo PPD#3 s/p  c/b sPEC s/p Magnesium treatment. Patient feels well. Pain is well controlled. She is tolerating a regular diet and passing flatus. She is voiding spontaneously, and ambulating without difficulty. Patient reports passing a fist-sized dark red clot this morning at 6AM but flushed it down the toilet before telling RN. Pt denies lightheadedness/dizziness, continuous gushing of blood or further clots. Denies CP/SOB. Denies N/V. Denies fever/chills.    O:  Vitals:   Vital Signs Last 24 Hrs  T(C): 36.7 (23 Sep 2024 05:30), Max: 37.9 (22 Sep 2024 15:36)  T(F): 98.1 (23 Sep 2024 05:30), Max: 100.2 (22 Sep 2024 15:36)  HR: 95 (23 Sep 2024 05:30) (89 - 112)  BP: 129/78 (23 Sep 2024 05:30) (126/76 - 137/83)  BP(mean): --  RR: 16 (23 Sep 2024 05:30) (16 - 18)  SpO2: 100% (23 Sep 2024 05:30) (100% - 100%)    Parameters below as of 23 Sep 2024 05:30  Patient On (Oxygen Delivery Method): room air        MEDICATIONS  (STANDING):  acetaminophen     Tablet .. 975 milliGRAM(s) Oral <User Schedule>  ascorbic acid 500 milliGRAM(s) Oral daily  diphtheria/tetanus/pertussis (acellular) Vaccine (Adacel) 0.5 milliLiter(s) IntraMuscular once  ferrous    sulfate 325 milliGRAM(s) Oral daily  ibuprofen  Tablet. 600 milliGRAM(s) Oral every 6 hours  lactated ringers. 1000 milliLiter(s) (50 mL/Hr) IV Continuous <Continuous>  NIFEdipine XL 30 milliGRAM(s) Oral daily  ondansetron Injectable 4 milliGRAM(s) IV Push once  pantoprazole    Tablet 40 milliGRAM(s) Oral daily  prenatal multivitamin 1 Tablet(s) Oral daily  sodium chloride 0.9% lock flush 3 milliLiter(s) IV Push every 8 hours    MEDICATIONS  (PRN):  benzocaine 20%/menthol 0.5% Spray 1 Spray(s) Topical every 6 hours PRN for Perineal discomfort  dibucaine 1% Ointment 1 Application(s) Topical every 6 hours PRN Perineal discomfort  diphenhydrAMINE 25 milliGRAM(s) Oral every 6 hours PRN Pruritus  hydrocortisone 1% Cream 1 Application(s) Topical every 6 hours PRN Moderate Pain (4-6)  lanolin Ointment 1 Application(s) Topical every 6 hours PRN nipple soreness  magnesium hydroxide Suspension 30 milliLiter(s) Oral two times a day PRN Constipation  oxyCODONE    IR 5 milliGRAM(s) Oral once PRN Moderate to Severe Pain (4-10)  oxyCODONE    IR 5 milliGRAM(s) Oral every 3 hours PRN Moderate to Severe Pain (4-10)  pramoxine 1%/zinc 5% Cream 1 Application(s) Topical every 4 hours PRN Moderate Pain (4-6)  simethicone 80 milliGRAM(s) Chew every 4 hours PRN Gas  witch hazel Pads 1 Application(s) Topical every 4 hours PRN Perineal discomfort      Labs:  Blood type: O Positive  Rubella IgG: RPR: Negative                          9.6[L]   13.16[H] >-----------< 207    (  @ 11:55 )             28.9[L]                        7.7[L]   12.03[H] >-----------< 185    (  @ 05:45 )             22.6[L]    24 @ 05:45      138  |  104  |  6[L]  ----------------------------<  89  3.6   |  21[L]  |  0.60        Ca    7.5[L]      21 Sep 2024 05:45  Mg     5.50[H]     -21  Mg     5.40[H]     -21  Mg     5.30[H]     09-20  Mg     5.20[H]     -    TPro  5.3[L]  /  Alb  3.0[L]  /  TBili  <0.2  /  DBili  x   /  AST  25  /  ALT  12  /  AlkPhos  162[H]  24 @ 05:45          Physical Exam:  General: NAD  Abdomen: soft, non-tender, non-distended, fundus firm  Vaginal: Lochia wnl  Extremities: No erythema/edema     OB Progress Note:  PPD#3    S: 22yo PPD#3 s/p  c/b sPEC (by BP) s/p Magnesium treatment. Patient feels well. Pain is well controlled. She is tolerating a regular diet and passing flatus. She is voiding spontaneously, and ambulating without difficulty. Patient reports passing a fist-sized dark red clot this morning at 6AM but flushed it down the toilet before telling RN. Pt denies lightheadedness/dizziness, continuous gushing of blood or further clots. Denies CP/SOB. Denies N/V. Denies fever/chills.    O:  Vitals:   Vital Signs Last 24 Hrs  T(C): 36.7 (23 Sep 2024 05:30), Max: 37.9 (22 Sep 2024 15:36)  T(F): 98.1 (23 Sep 2024 05:30), Max: 100.2 (22 Sep 2024 15:36)  HR: 95 (23 Sep 2024 05:30) (89 - 112)  BP: 129/78 (23 Sep 2024 05:30) (126/76 - 137/83)  BP(mean): --  RR: 16 (23 Sep 2024 05:30) (16 - 18)  SpO2: 100% (23 Sep 2024 05:30) (100% - 100%)    Parameters below as of 23 Sep 2024 05:30  Patient On (Oxygen Delivery Method): room air        MEDICATIONS  (STANDING):  acetaminophen     Tablet .. 975 milliGRAM(s) Oral <User Schedule>  ascorbic acid 500 milliGRAM(s) Oral daily  diphtheria/tetanus/pertussis (acellular) Vaccine (Adacel) 0.5 milliLiter(s) IntraMuscular once  ferrous    sulfate 325 milliGRAM(s) Oral daily  ibuprofen  Tablet. 600 milliGRAM(s) Oral every 6 hours  lactated ringers. 1000 milliLiter(s) (50 mL/Hr) IV Continuous <Continuous>  NIFEdipine XL 30 milliGRAM(s) Oral daily  ondansetron Injectable 4 milliGRAM(s) IV Push once  pantoprazole    Tablet 40 milliGRAM(s) Oral daily  prenatal multivitamin 1 Tablet(s) Oral daily  sodium chloride 0.9% lock flush 3 milliLiter(s) IV Push every 8 hours    MEDICATIONS  (PRN):  benzocaine 20%/menthol 0.5% Spray 1 Spray(s) Topical every 6 hours PRN for Perineal discomfort  dibucaine 1% Ointment 1 Application(s) Topical every 6 hours PRN Perineal discomfort  diphenhydrAMINE 25 milliGRAM(s) Oral every 6 hours PRN Pruritus  hydrocortisone 1% Cream 1 Application(s) Topical every 6 hours PRN Moderate Pain (4-6)  lanolin Ointment 1 Application(s) Topical every 6 hours PRN nipple soreness  magnesium hydroxide Suspension 30 milliLiter(s) Oral two times a day PRN Constipation  oxyCODONE    IR 5 milliGRAM(s) Oral once PRN Moderate to Severe Pain (4-10)  oxyCODONE    IR 5 milliGRAM(s) Oral every 3 hours PRN Moderate to Severe Pain (4-10)  pramoxine 1%/zinc 5% Cream 1 Application(s) Topical every 4 hours PRN Moderate Pain (4-6)  simethicone 80 milliGRAM(s) Chew every 4 hours PRN Gas  witch hazel Pads 1 Application(s) Topical every 4 hours PRN Perineal discomfort      Labs:  Blood type: O Positive  Rubella IgG: RPR: Negative                          9.6[L]   13.16[H] >-----------< 207    (  @ 11:55 )             28.9[L]                        7.7[L]   12.03[H] >-----------< 185    (  @ 05:45 )             22.6[L]    24 @ 05:45      138  |  104  |  6[L]  ----------------------------<  89  3.6   |  21[L]  |  0.60        Ca    7.5[L]      21 Sep 2024 05:45  Mg     5.50[H]     -21  Mg     5.40[H]     -  Mg     5.30[H]     09-  Mg     5.20[H]     -    TPro  5.3[L]  /  Alb  3.0[L]  /  TBili  <0.2  /  DBili  x   /  AST  25  /  ALT  12  /  AlkPhos  162[H]  24 @ 05:45          Physical Exam:  General: NAD  Abdomen: soft, non-tender, non-distended, fundus firm  Vaginal: Lochia wnl  Extremities: No erythema/edema     OB Progress Note:  PPD#3    S: 24yo PPD#3 s/p  c/b sPEC (by BP) s/p Magnesium treatment. Patient feels well. Pain is well controlled. She is tolerating a regular diet and passing flatus. She is voiding spontaneously, and ambulating without difficulty. Patient reports passing a fist-sized dark red clot this morning at 6AM but flushed it down the toilet before telling RN. Pt denies lightheadedness/dizziness, continuous gushing of blood or further clots. Denies CP/SOB. Denies headache or changes in vision. Denies N/V. Denies epigastric or RUQ pain. Denies fever/chills.    O:  Vitals:   Vital Signs Last 24 Hrs  T(C): 36.7 (23 Sep 2024 05:30), Max: 37.9 (22 Sep 2024 15:36)  T(F): 98.1 (23 Sep 2024 05:30), Max: 100.2 (22 Sep 2024 15:36)  HR: 95 (23 Sep 2024 05:30) (89 - 112)  BP: 129/78 (23 Sep 2024 05:30) (126/76 - 137/83)  BP(mean): --  RR: 16 (23 Sep 2024 05:30) (16 - 18)  SpO2: 100% (23 Sep 2024 05:30) (100% - 100%)    Parameters below as of 23 Sep 2024 05:30  Patient On (Oxygen Delivery Method): room air        MEDICATIONS  (STANDING):  acetaminophen     Tablet .. 975 milliGRAM(s) Oral <User Schedule>  ascorbic acid 500 milliGRAM(s) Oral daily  diphtheria/tetanus/pertussis (acellular) Vaccine (Adacel) 0.5 milliLiter(s) IntraMuscular once  ferrous    sulfate 325 milliGRAM(s) Oral daily  ibuprofen  Tablet. 600 milliGRAM(s) Oral every 6 hours  lactated ringers. 1000 milliLiter(s) (50 mL/Hr) IV Continuous <Continuous>  NIFEdipine XL 30 milliGRAM(s) Oral daily  ondansetron Injectable 4 milliGRAM(s) IV Push once  pantoprazole    Tablet 40 milliGRAM(s) Oral daily  prenatal multivitamin 1 Tablet(s) Oral daily  sodium chloride 0.9% lock flush 3 milliLiter(s) IV Push every 8 hours    MEDICATIONS  (PRN):  benzocaine 20%/menthol 0.5% Spray 1 Spray(s) Topical every 6 hours PRN for Perineal discomfort  dibucaine 1% Ointment 1 Application(s) Topical every 6 hours PRN Perineal discomfort  diphenhydrAMINE 25 milliGRAM(s) Oral every 6 hours PRN Pruritus  hydrocortisone 1% Cream 1 Application(s) Topical every 6 hours PRN Moderate Pain (4-6)  lanolin Ointment 1 Application(s) Topical every 6 hours PRN nipple soreness  magnesium hydroxide Suspension 30 milliLiter(s) Oral two times a day PRN Constipation  oxyCODONE    IR 5 milliGRAM(s) Oral once PRN Moderate to Severe Pain (4-10)  oxyCODONE    IR 5 milliGRAM(s) Oral every 3 hours PRN Moderate to Severe Pain (4-10)  pramoxine 1%/zinc 5% Cream 1 Application(s) Topical every 4 hours PRN Moderate Pain (4-6)  simethicone 80 milliGRAM(s) Chew every 4 hours PRN Gas  witch hazel Pads 1 Application(s) Topical every 4 hours PRN Perineal discomfort      Labs:  Blood type: O Positive  Rubella IgG: RPR: Negative                          9.6[L]   13.16[H] >-----------< 207    (  @ 11:55 )             28.9[L]                        7.7[L]   12.03[H] >-----------< 185    (  @ 05:45 )             22.6[L]    - @ 05:45      138  |  104  |  6[L]  ----------------------------<  89  3.6   |  21[L]  |  0.60        Ca    7.5[L]      21 Sep 2024 05:45  Mg     5.50[H]     09-21  Mg     5.40[H]     09-21  Mg     5.30[H]     09-20  Mg     5.20[H]     09-    TPro  5.3[L]  /  Alb  3.0[L]  /  TBili  <0.2  /  DBili  x   /  AST  25  /  ALT  12  /  AlkPhos  162[H]  24 @ 05:45          Physical Exam:  General: NAD  Abdomen: soft, non-tender, non-distended, fundus firm  Vaginal: Lochia wnl  Extremities: No erythema/edema

## 2024-09-23 NOTE — PROGRESS NOTE ADULT - ATTENDING COMMENTS
Pt seen and examined.  Pt reports feeling dizzy with ambulation, pt denies palpitations.  Denies HA, CP, SOB, calf pain, fevers/chills.  Pt tolerating regular diet -N/-V, +flatus.  Pt voiding and ambulating.  Pt reports decreasing lochia.     ICU Vital Signs Last 24 Hrs  T(C): 37.1 (21 Sep 2024 10:11), Max: 37.1 (21 Sep 2024 10:11)  T(F): 98.7 (21 Sep 2024 10:11), Max: 98.7 (21 Sep 2024 10:11)  HR: 100 (21 Sep 2024 10:11) (81 - 119)  BP: 121/82 (21 Sep 2024 10:11) (117/77 - 187/90)  BP(mean): --  ABP: --  ABP(mean): --  RR: 18 (21 Sep 2024 10:11) (17 - 18)  SpO2: 96% (21 Sep 2024 10:11) (90% - 100%)    O2 Parameters below as of 21 Sep 2024 10:11  Patient On (Oxygen Delivery Method): room air    General: NAD  Abd: fundus firm nontender  Ext: no calf tenderness b/l     PPD#1 sp VD course complicated by fetal growth restriction and preeclampsia with severe features  1.  Preeclampsia with severe features - sp magnesium sulfate for seizure ppx.  BP's within goal range with current regimen on Procardia 30mg XL.  Pt asymptomatic.  Continue BP monitoring.     2.  Acute blood loss anemia- Hct 35.4-->22.6 pt symptomatic.  Will repeat cbc at noon, RN and pt aware.  If downtrending consider blood transfusion.     3.  Continue routine pp care    Mary Perez MD
Pt seen and examined.  Pt with no complaints this morning.  Pt states she no longer feels dizzy with ambulation.   Denies HA, CP, SOB, calf pain, fevers/chills.  Pt also denies visual changes, RUQ/epigastric pain.  Tolerating regular diet -N/-V, +flatus.  Pt voiding and ambulating.  Pt reports decreasing lochia.     ICU Vital Signs Last 24 Hrs  T(C): 36.8 (22 Sep 2024 05:40), Max: 37.2 (22 Sep 2024 01:57)  T(F): 98.3 (22 Sep 2024 05:40), Max: 98.9 (22 Sep 2024 01:57)  HR: 99 (22 Sep 2024 05:40) (84 - 100)  BP: 122/85 (22 Sep 2024 05:40) (110/58 - 125/70)  BP(mean): 71 (22 Sep 2024 01:57) (71 - 71)  ABP: --  ABP(mean): --  RR: 19 (22 Sep 2024 05:40) (17 - 19)  SpO2: 100% (22 Sep 2024 05:40) (96% - 100%)    O2 Parameters below as of 22 Sep 2024 05:40  Patient On (Oxygen Delivery Method): room air      General: NAD  Abd: fundus firm nontender  Ext: no calf tenderness b/l     PPD#2 sp VD course complicated by fetal growth restriction and preeclampsia with severe features    1.  Preeclampsia with severe features - sp magnesium sulfate for seizure ppx.  BP's within goal range with current regimen on Procardia 30mg XL.  Pt asymptomatic. Discussed need for continued BP monitoring at home.  Discussed BP parameters, when to call, when to return to hospital.  Discussed si/sx to monitor for.  Pt made aware that she should follow up in clinic this week, pt agreed.     2.  Acute blood loss anemia after vaginal delivery- Hct 35.4-->22.6-->28.9 pt asymptomatic today, continue iron supplement.     3.  Pt doing well, cleared for discharge today.     Mary Perez MD .
Associate Chief of L & D (Late entry)     I have met this patient for the first time today.  She received her care at [facility] and was admitted for IOL due to FGR by DR Quigley and delivered by Dr Perez    OB Progress Note:  PPD#3    S: 22yo  PPD#3 s/p . Patient denies CP/SOB. Denies lightheadedness/dizziness. Denies N/V.    O:  Vitals:  Vital Signs Last 24 Hrs  T(C): 37 (23 Sep 2024 10:32), Max: 37.9 (22 Sep 2024 15:36)  T(F): 98.6 (23 Sep 2024 10:32), Max: 100.2 (22 Sep 2024 15:36)  HR: 107 (23 Sep 2024 10:32) (90 - 112)  BP: 124/86 (23 Sep 2024 10:32) (124/86 - 137/83)  RR: 18 (23 Sep 2024 10:32) (16 - 18)  SpO2: 100% (23 Sep 2024 10:32) (100% - 100%)    Parameters below as of 23 Sep 2024 05:30  Patient On (Oxygen Delivery Method): room air        MEDICATIONS  (STANDING):  acetaminophen     Tablet .. 975 milliGRAM(s) Oral <User Schedule>  ascorbic acid 500 milliGRAM(s) Oral daily  diphtheria/tetanus/pertussis (acellular) Vaccine (Adacel) 0.5 milliLiter(s) IntraMuscular once  ferrous    sulfate 325 milliGRAM(s) Oral daily  ibuprofen  Tablet. 600 milliGRAM(s) Oral every 6 hours  lactated ringers. 1000 milliLiter(s) (50 mL/Hr) IV Continuous <Continuous>  NIFEdipine XL 30 milliGRAM(s) Oral daily  ondansetron Injectable 4 milliGRAM(s) IV Push once  pantoprazole    Tablet 40 milliGRAM(s) Oral daily  prenatal multivitamin 1 Tablet(s) Oral daily  sodium chloride 0.9% lock flush 3 milliLiter(s) IV Push every 8 hours      Labs:  Blood type: O Positive  Rubella IgG: RPR: Negative                          9.6[L]   13.16[H] >-----------< 207    (  @ 11:55 )             28.9[L]                        7.7[L]   12.03[H] >-----------< 185    (  @ 05:45 )             22.6[L]    24 @ 05:45      138  |  104  |  6[L]  ----------------------------<  89  3.6   |  21[L]  |  0.60        TPro  5.3[L]  /  Alb  3.0[L]  /  TBili  <0.2  /  DBili  x   /  AST  25  /  ALT  12  /  AlkPhos  162[H]  24 @ 05:45          Physical Exam:    Abdomen: soft, non-tender, non-distended, fundus firm  Vaginal: Lochia wnl  Extremities: No erythema/+1 edema    A/P: 22yo PPD#3 s/p  and repair of 2nd degree laceration complicated by PEC with severe features     - Patient is stable for discharge and will follow in the PP clinic  - Monitor BP's closely  - Continue Procardia 30 mg XL and log BP's and bring for PP BP check   -  Ruth Reina M.D., M.B.A., M.S.

## 2024-09-23 NOTE — PROGRESS NOTE ADULT - ASSESSMENT
A/P: 22yo PPD#2 s/p  c/b sPEC s/p Magnesium treatment.  Patient is stable and doing well post-partum. VSS, AF.    #sPEC  - BP's within range overnight: 120-130s/70-80s  - Continue Procardia 30XL QD (-)  - s/p Magnesium for seizure prophylaxis (-)  - s/p Procardia 10 IR ()  - HELLP labs wnl, P/C: 1.4  - Cont to monitor for S/s sPEC  - d/c home with BP cuff and BP log    #Routine postpartum care  - Pain well controlled, continue current pain regimen  - Increase ambulation, SCDs when not ambulating  - Continue regular diet  - Discharge planning     Delaney Macedo PGY1 A/P: 22yo PPD#2 s/p  c/b sPEC s/p Magnesium treatment.  Patient is stable and doing well post-partum. VSS, AF.    #sPEC  - BP's within range overnight: 120-130s/70-80s  - Continue Procardia 30XL QD (-)  - s/p Magnesium for seizure prophylaxis (-)  - s/p Procardia 10 IR ()  - HELLP labs wnl, P/C: 1.4  - Cont to monitor for S/s sPEC  - d/c home with BP cuff and BP log    #Routine postpartum care  - Pain well controlled, continue current pain regimen  - Increase ambulation, SCDs when not ambulating  - Continue regular diet  - Patient was cleared to be discharged yesterday, but needed to stay an extra night due to lack of furniture in new apartment over the weekend  - Plan for discharge today    Delaney Macedo PGY1 A/P: 24yo PPD#2 s/p  c/b sPEC s/p Magnesium treatment.  Patient is stable and doing well post-partum. VSS, AF.    #sPEC  - BP's within range overnight: 120-130s/70-80s  - Continue Procardia 30XL QD (-)  - s/p Magnesium for seizure prophylaxis (-)  - s/p Procardia 10 IR ()  - HELLP labs wnl, P/C: 1.4  - Cont to monitor for S/s sPEC  - d/c home with BP cuff and BP log    #Routine postpartum care  - Pain well controlled, continue current pain regimen  - Increase ambulation, SCDs when not ambulating  - Continue regular diet  - Postpartum contraception: declines  - Patient was cleared to be discharged yesterday, but needed to stay an extra night due to lack of furniture in new apartment over the weekend  - Plan for discharge today    Delaney Macedo PGY1 A/P: 24yo PPD#2 s/p  c/b sPEC (by BP) s/p Magnesium treatment.  Patient is stable and doing well post-partum. VSS, AF.    #sPEC  - BP's within range overnight: 120-130s/70-80s  - Continue Procardia 30XL QD (-)  - s/p Magnesium for seizure prophylaxis (-)  - s/p Procardia 10 IR ()  - HELLP labs wnl, P/C: 1.4  - Cont to monitor for S/s sPEC  - d/c home with BP cuff and BP log    #Routine postpartum care  - Pain well controlled, continue current pain regimen  - Increase ambulation, SCDs when not ambulating  - Continue regular diet  - Postpartum contraception: declines  - Patient was cleared to be discharged yesterday, but needed to stay an extra night due to lack of furniture in new apartment over the weekend  - Plan for discharge today    Delaney Macedo PGY1

## 2024-09-26 ENCOUNTER — NON-APPOINTMENT (OUTPATIENT)
Age: 24
End: 2024-09-26

## 2024-09-26 ENCOUNTER — APPOINTMENT (OUTPATIENT)
Dept: OBGYN | Facility: HOSPITAL | Age: 24
End: 2024-09-26

## 2024-09-26 ENCOUNTER — OUTPATIENT (OUTPATIENT)
Dept: INPATIENT UNIT | Facility: HOSPITAL | Age: 24
LOS: 1 days | Discharge: HOME | End: 2024-09-26
Payer: MEDICAID

## 2024-09-26 ENCOUNTER — APPOINTMENT (OUTPATIENT)
Dept: ANTEPARTUM | Facility: CLINIC | Age: 24
End: 2024-09-26

## 2024-09-26 DIAGNOSIS — O26.899 OTHER SPECIFIED PREGNANCY RELATED CONDITIONS, UNSPECIFIED TRIMESTER: ICD-10-CM

## 2024-09-26 PROBLEM — Z00.00 ENCOUNTER FOR PREVENTIVE HEALTH EXAMINATION: Status: ACTIVE | Noted: 2024-09-26

## 2024-09-26 LAB
ALBUMIN SERPL ELPH-MCNC: 3.6 G/DL — SIGNIFICANT CHANGE UP (ref 3.3–5)
ALP SERPL-CCNC: 143 U/L — HIGH (ref 40–120)
ALT FLD-CCNC: 21 U/L — SIGNIFICANT CHANGE UP (ref 4–33)
ANION GAP SERPL CALC-SCNC: 10 MMOL/L — SIGNIFICANT CHANGE UP (ref 7–14)
AST SERPL-CCNC: 21 U/L — SIGNIFICANT CHANGE UP (ref 4–32)
BASOPHILS # BLD AUTO: 0.02 K/UL — SIGNIFICANT CHANGE UP (ref 0–0.2)
BASOPHILS NFR BLD AUTO: 0.3 % — SIGNIFICANT CHANGE UP (ref 0–2)
BILIRUB SERPL-MCNC: <0.2 MG/DL — SIGNIFICANT CHANGE UP (ref 0.2–1.2)
BUN SERPL-MCNC: 8 MG/DL — SIGNIFICANT CHANGE UP (ref 7–23)
CALCIUM SERPL-MCNC: 9.3 MG/DL — SIGNIFICANT CHANGE UP (ref 8.4–10.5)
CHLORIDE SERPL-SCNC: 108 MMOL/L — HIGH (ref 98–107)
CO2 SERPL-SCNC: 21 MMOL/L — LOW (ref 22–31)
CREAT SERPL-MCNC: 0.53 MG/DL — SIGNIFICANT CHANGE UP (ref 0.5–1.3)
EGFR: 132 ML/MIN/1.73M2 — SIGNIFICANT CHANGE UP
EOSINOPHIL # BLD AUTO: 0.01 K/UL — SIGNIFICANT CHANGE UP (ref 0–0.5)
EOSINOPHIL NFR BLD AUTO: 0.1 % — SIGNIFICANT CHANGE UP (ref 0–6)
GLUCOSE SERPL-MCNC: 91 MG/DL — SIGNIFICANT CHANGE UP (ref 70–99)
HCT VFR BLD CALC: 28.1 % — LOW (ref 34.5–45)
HGB BLD-MCNC: 9.1 G/DL — LOW (ref 11.5–15.5)
IANC: 3.6 K/UL — SIGNIFICANT CHANGE UP (ref 1.8–7.4)
IMM GRANULOCYTES NFR BLD AUTO: 4.2 % — HIGH (ref 0–0.9)
LDH SERPL L TO P-CCNC: 214 U/L — SIGNIFICANT CHANGE UP (ref 135–225)
LYMPHOCYTES # BLD AUTO: 2.59 K/UL — SIGNIFICANT CHANGE UP (ref 1–3.3)
LYMPHOCYTES # BLD AUTO: 35 % — SIGNIFICANT CHANGE UP (ref 13–44)
MCHC RBC-ENTMCNC: 28 PG — SIGNIFICANT CHANGE UP (ref 27–34)
MCHC RBC-ENTMCNC: 32.4 GM/DL — SIGNIFICANT CHANGE UP (ref 32–36)
MCV RBC AUTO: 86.5 FL — SIGNIFICANT CHANGE UP (ref 80–100)
MONOCYTES # BLD AUTO: 0.86 K/UL — SIGNIFICANT CHANGE UP (ref 0–0.9)
MONOCYTES NFR BLD AUTO: 11.6 % — SIGNIFICANT CHANGE UP (ref 2–14)
NEUTROPHILS # BLD AUTO: 3.6 K/UL — SIGNIFICANT CHANGE UP (ref 1.8–7.4)
NEUTROPHILS NFR BLD AUTO: 48.8 % — SIGNIFICANT CHANGE UP (ref 43–77)
NRBC # BLD: 0 /100 WBCS — SIGNIFICANT CHANGE UP (ref 0–0)
NRBC # FLD: 0.05 K/UL — HIGH (ref 0–0)
PLATELET # BLD AUTO: 334 K/UL — SIGNIFICANT CHANGE UP (ref 150–400)
POTASSIUM SERPL-MCNC: 4.3 MMOL/L — SIGNIFICANT CHANGE UP (ref 3.5–5.3)
POTASSIUM SERPL-SCNC: 4.3 MMOL/L — SIGNIFICANT CHANGE UP (ref 3.5–5.3)
PROT SERPL-MCNC: 7.1 G/DL — SIGNIFICANT CHANGE UP (ref 6–8.3)
RBC # BLD: 3.25 M/UL — LOW (ref 3.8–5.2)
RBC # FLD: 14.8 % — HIGH (ref 10.3–14.5)
SODIUM SERPL-SCNC: 139 MMOL/L — SIGNIFICANT CHANGE UP (ref 135–145)
URATE SERPL-MCNC: 5.4 MG/DL — SIGNIFICANT CHANGE UP (ref 2.5–7)
WBC # BLD: 7.39 K/UL — SIGNIFICANT CHANGE UP (ref 3.8–10.5)
WBC # FLD AUTO: 7.39 K/UL — SIGNIFICANT CHANGE UP (ref 3.8–10.5)

## 2024-09-26 PROCEDURE — 99213 OFFICE O/P EST LOW 20 MIN: CPT

## 2024-09-26 NOTE — OB POSTPARTUM TRIAGE NOTE - NS_OBGYNHISTORY_OBGYN_ALL_OB_FT
OB History: Primigravida    GYN History: Denies history of endometriosis/uterine fibroids/cysts/abnormal pap smears/STIs   22 PEC S/P Mag and procardia 30XL daily

## 2024-09-26 NOTE — OB POSTPARTUM TRIAGE NOTE - NSHPLABSRESULTS_GEN_ALL_CORE
CBC Full  -  ( 26 Sep 2024 20:12 )  WBC Count : 7.39 K/uL  RBC Count : 3.25 M/uL  Hemoglobin : 9.1 g/dL  Hematocrit : 28.1 %  Platelet Count - Automated : 334 K/uL  Mean Cell Volume : 86.5 fL  Mean Cell Hemoglobin : 28.0 pg  Mean Cell Hemoglobin Concentration : 32.4 gm/dL  Auto Neutrophil # : 3.60 K/uL  Auto Lymphocyte # : 2.59 K/uL  Auto Monocyte # : 0.86 K/uL  Auto Eosinophil # : 0.01 K/uL  Auto Basophil # : 0.02 K/uL  Auto Neutrophil % : 48.8 %  Auto Lymphocyte % : 35.0 %  Auto Monocyte % : 11.6 %  Auto Eosinophil % : 0.1 %  Auto Basophil % : 0.3 %  09-26    139  |  108[H]  |  8   ----------------------------<  91  4.3   |  21[L]  |  0.53    Ca    9.3      26 Sep 2024 20:12    TPro  7.1  /  Alb  3.6  /  TBili  <0.2  /  DBili  x   /  AST  21  /  ALT  21  /  AlkPhos  143[H]  09-26

## 2024-09-26 NOTE — OB POSTPARTUM TRIAGE NOTE - HISTORY OF PRESENT ILLNESS
24 y/o  at 39w4d GA, EMANUEL 24, sent from office for IOL for fetal growth restriction.       22 y/o  S/P  on 24 @39w4d PEC S/P mag and currently on procardia 30Xl daily, present to D&T for blood pressure check, Pt report that  she had an appointment today and though that this was the clinic.  Pt report that her blood pressure has been in the normal range at home, report checking her blood pressure 3 time daily. last dose of procardia 6am this morning. Pt denies headaches, visual changes and epigastric pain.   prenatal care with low risk clinic.   allergy: NKDA  meds: PNV, procardia 30 XL daily.

## 2024-09-26 NOTE — OB POSTPARTUM TRIAGE NOTE - NS_ATTENDINFORMED_OBGYN_ALL_OB
Received a message from pharmacy attached below:  Pharmacy comment: pt has been taking brand synthroid. ins requires SHANNAN-1 for brand. if pt need brand, please send new rx with SHANNAN-1 brand only  New script sent through to pharmacy.  
Tressa Fischer MD

## 2024-09-26 NOTE — OB POSTPARTUM TRIAGE NOTE - NSOBPROVIDERNOTE_OBGYN_ALL_OB_FT
22 y/o  S/P  on 24 @39w4d PEC S/P mag and currently on procardia 30Xl daily  HELLP labs WNL  blood pressure monitoring    Discuss with Dr. Aaliyah perrin for discharge  S/S of Pec reviewed with patient  pt to continue taking procardia as ordered daily  continue checking blood pressure as ordered  Discharge patient home.  All ordered tests results reviewed and interpreted.  Plan of care was reviewed with patient and family; patient states understanding of the above plan.  Pt Discharged home @ 0800P

## 2024-09-26 NOTE — OB POSTPARTUM TRIAGE NOTE - NSHPPHYSICALEXAM_GEN_ALL_CORE
GENERAL: no weakness, no fever/chills, no weight loss/gain  EYES/ENT: No visual changes, no vertigo or throat pain  NECK: No pain or stiffness   RESPIRATORY: no cough, no wheezing, no hemoptysis, no dyspnea, no shortness of breath  CARDIOVASCULAR: no chest pain or palpitations  GASTROINTESTINAL: no n/v/d, no abdominal or epigastric pain  GENITOURINARY: no dysuria, no frequency, no nocturia, no hematuria  MUSCULOSKELETAL: no trauma, no sprain/strain, no myalgias, no arthralgias, no fracture  NEUROLOGICAL: no HA, no dizziness, no weakness, no numbness  SKIN: No itching, rashes  Vital Signs Last 24 Hrs  T(C): -- 36.9  T(F): --  HR: -- 78-84   BP: --  130/86, 110/69, 110/86, 143/86, 120/77, 124/81, 125/81, 118/80  BP(mean): -- 83- 104  RR: --18  SpO2: --

## 2024-09-26 NOTE — OB POSTPARTUM TRIAGE NOTE - ADDITIONAL INSTRUCTIONS
please continue to check your blood pressure at home   call the clinic to reschedule appointment  continue taking procardia as schedule.

## 2024-09-26 NOTE — OB POSTPARTUM TRIAGE NOTE - CHIEF COMPLAINT QUOTE
Pt had an appointment @ the clinic today for blood pressure check and thought that she was at the low risk clinic.

## 2024-10-03 ENCOUNTER — APPOINTMENT (OUTPATIENT)
Dept: OBGYN | Facility: HOSPITAL | Age: 24
End: 2024-10-03

## 2024-10-03 ENCOUNTER — OUTPATIENT (OUTPATIENT)
Dept: OUTPATIENT SERVICES | Facility: HOSPITAL | Age: 24
LOS: 1 days | End: 2024-10-03

## 2024-10-03 VITALS
HEART RATE: 80 BPM | BODY MASS INDEX: 27.38 KG/M2 | TEMPERATURE: 97.6 F | SYSTOLIC BLOOD PRESSURE: 126 MMHG | WEIGHT: 145 LBS | DIASTOLIC BLOOD PRESSURE: 74 MMHG | HEIGHT: 61 IN

## 2024-10-03 DIAGNOSIS — Z01.30 ENCOUNTER FOR EXAMINATION OF BLOOD PRESSURE W/OUT ABNORMAL FINDINGS: ICD-10-CM

## 2024-10-03 PROCEDURE — 99213 OFFICE O/P EST LOW 20 MIN: CPT

## 2024-10-03 RX ORDER — NIFEDIPINE 30 MG/1
30 TABLET, FILM COATED, EXTENDED RELEASE ORAL DAILY
Qty: 30 | Refills: 2 | Status: ACTIVE | COMMUNITY
Start: 2024-10-03

## 2024-10-03 NOTE — HISTORY OF PRESENT ILLNESS
[Complications:___] : complications include: [unfilled] [Preeclampsia] : preeclampsia [GA Delivery ___wks] : [unfilled] weeks GA at delivery [Induced] : induced labor [Vaginal] : delivered vaginally [None] : no antepartum treatment [Antihypertensives] : antihypertensives [Magnesium Sulfate] : magnesium sulfate [Delivery Date: ___] : on [unfilled] [] : delivered by vaginal delivery [BF with Difficulty] : nursing with difficulty [FreeTextEntry8] : Here for BP check [FreeTextEntry9] : Had prenatal care at [facility].  Third trimester started to have increased BP's and baby diagnosed with FGR.  Patient sent to L&D with PEC for IOL [de-identified] : BP check.  /74 at todays visit.  Patient reports normal BP's at home. Parameters reviewed.  She is currently taking Procardia 30 mg XL daily.  Will refer to cardiology.  Patient to RTC 4-5 weeks for full postpartum visit or prn.  Would like Depo Provera at next visit.

## 2024-10-04 DIAGNOSIS — Z01.30 ENCOUNTER FOR EXAMINATION OF BLOOD PRESSURE WITHOUT ABNORMAL FINDINGS: ICD-10-CM

## 2024-11-12 ENCOUNTER — APPOINTMENT (OUTPATIENT)
Dept: OBGYN | Facility: HOSPITAL | Age: 24
End: 2024-11-12

## 2024-11-15 NOTE — ED PROVIDER NOTE - HISTORY ATTESTATION, MLM
Patient called requesting refill for estrace. Patient made aware medication was refilled on 11/14/2024 for 1 with 0 refills to [facility] pharmacy. Patient instructed to contact the pharmacy to obtain refills of medication. Patient verbalized understanding.     I have reviewed and confirmed nurses' notes...

## 2024-12-04 ENCOUNTER — APPOINTMENT (OUTPATIENT)
Dept: CARDIOLOGY | Facility: CLINIC | Age: 24
End: 2024-12-04

## 2025-01-16 ENCOUNTER — APPOINTMENT (OUTPATIENT)
Dept: OBGYN | Facility: HOSPITAL | Age: 25
End: 2025-01-16

## 2025-01-29 ENCOUNTER — APPOINTMENT (OUTPATIENT)
Dept: CARDIOLOGY | Facility: CLINIC | Age: 25
End: 2025-01-29